# Patient Record
Sex: FEMALE
[De-identification: names, ages, dates, MRNs, and addresses within clinical notes are randomized per-mention and may not be internally consistent; named-entity substitution may affect disease eponyms.]

---

## 2017-01-04 ENCOUNTER — HOSPITAL (OUTPATIENT)
Dept: OTHER | Age: 62
End: 2017-01-04
Attending: EMERGENCY MEDICINE

## 2018-02-18 ENCOUNTER — HOSPITAL ENCOUNTER (EMERGENCY)
Age: 63
Discharge: HOME | End: 2018-02-18
Attending: EMERGENCY MEDICINE
Payer: COMMERCIAL

## 2018-02-18 ENCOUNTER — APPOINTMENT (OUTPATIENT)
Dept: GENERAL RADIOLOGY | Age: 63
End: 2018-02-18
Attending: EMERGENCY MEDICINE
Payer: COMMERCIAL

## 2018-02-18 VITALS
SYSTOLIC BLOOD PRESSURE: 100 MMHG | RESPIRATION RATE: 16 BRPM | WEIGHT: 185 LBS | HEIGHT: 65 IN | OXYGEN SATURATION: 99 % | BODY MASS INDEX: 30.82 KG/M2 | DIASTOLIC BLOOD PRESSURE: 53 MMHG | TEMPERATURE: 98 F | HEART RATE: 72 BPM

## 2018-02-18 DIAGNOSIS — M79.18 MUSCULOSKELETAL PAIN: Primary | ICD-10-CM

## 2018-02-18 PROCEDURE — 72170 X-RAY EXAM OF PELVIS: CPT | Performed by: EMERGENCY MEDICINE

## 2018-02-18 PROCEDURE — 99284 EMERGENCY DEPT VISIT MOD MDM: CPT

## 2018-02-18 PROCEDURE — 73552 X-RAY EXAM OF FEMUR 2/>: CPT | Performed by: EMERGENCY MEDICINE

## 2018-02-18 RX ORDER — QUETIAPINE 100 MG/1
150 TABLET, FILM COATED ORAL NIGHTLY
COMMUNITY
End: 2020-08-21 | Stop reason: DRUGHIGH

## 2018-02-18 RX ORDER — MONTELUKAST SODIUM 10 MG/1
10 TABLET ORAL NIGHTLY
COMMUNITY

## 2018-02-18 RX ORDER — DULOXETIN HYDROCHLORIDE 30 MG/1
30 CAPSULE, DELAYED RELEASE ORAL DAILY
COMMUNITY
End: 2020-08-21

## 2018-02-18 RX ORDER — ACETAMINOPHEN 500 MG
1000 TABLET ORAL ONCE
Status: COMPLETED | OUTPATIENT
Start: 2018-02-18 | End: 2018-02-18

## 2018-02-18 RX ORDER — CLONAZEPAM 1 MG/1
1 TABLET ORAL 2 TIMES DAILY PRN
Status: ON HOLD | COMMUNITY
End: 2020-11-13

## 2018-02-18 RX ORDER — DEXTROAMPHETAMINE SACCHARATE, AMPHETAMINE ASPARTATE MONOHYDRATE, DEXTROAMPHETAMINE SULFATE AND AMPHETAMINE SULFATE 7.5; 7.5; 7.5; 7.5 MG/1; MG/1; MG/1; MG/1
30 CAPSULE, EXTENDED RELEASE ORAL EVERY MORNING
Status: ON HOLD | COMMUNITY
End: 2020-11-09

## 2018-02-18 RX ORDER — CYCLOBENZAPRINE HCL 10 MG
10 TABLET ORAL 3 TIMES DAILY PRN
Qty: 20 TABLET | Refills: 0 | Status: SHIPPED | OUTPATIENT
Start: 2018-02-18 | End: 2018-02-25

## 2018-02-18 RX ORDER — FLUTICASONE PROPIONATE AND SALMETEROL 100; 50 UG/1; UG/1
1 POWDER RESPIRATORY (INHALATION) 2 TIMES DAILY
COMMUNITY
End: 2020-09-18

## 2018-02-18 RX ORDER — LAMOTRIGINE 200 MG/1
200 TABLET ORAL DAILY
COMMUNITY
End: 2020-08-21 | Stop reason: DRUGHIGH

## 2018-02-18 RX ORDER — ALBUTEROL SULFATE 90 UG/1
AEROSOL, METERED RESPIRATORY (INHALATION) EVERY 6 HOURS PRN
COMMUNITY

## 2018-02-18 RX ORDER — MELOXICAM 7.5 MG/1
7.5 TABLET ORAL DAILY
COMMUNITY
End: 2020-08-21 | Stop reason: DRUGHIGH

## 2018-02-18 NOTE — ED PROVIDER NOTES
Patient Seen in: [facility] In [place]    History   Patient presents with:  Lower Extremity Injury (musculoskeletal)  Pain (neurologic)    Stated Complaint: BILAT THIGH PAIN STATES PAIN SINCE SNOW FALL LAST WEEK    HPI    This is a 57 soft nontender the upper extremities normal the left lower extremity there is mild tenderness over the anterior thigh there is no edema is full range of motion of the hip knee ankle and foot neurovascular is intact the right lower extremity there is [employer]

## 2018-03-16 PROBLEM — M16.11 ARTHRITIS OF RIGHT HIP: Status: ACTIVE | Noted: 2018-03-16

## 2018-03-23 PROBLEM — S76.211D GROIN STRAIN, RIGHT, SUBSEQUENT ENCOUNTER: Status: ACTIVE | Noted: 2018-03-23

## 2018-04-19 PROBLEM — M19.90 GENERALIZED ARTHRITIS: Status: ACTIVE | Noted: 2018-04-19

## 2020-08-21 PROBLEM — E55.9 VITAMIN D DEFICIENCY: Status: ACTIVE | Noted: 2020-08-21

## 2020-10-12 PROBLEM — M79.671 RIGHT FOOT PAIN: Status: ACTIVE | Noted: 2020-10-12

## 2020-10-12 PROBLEM — R26.2 DIFFICULTY WALKING: Status: ACTIVE | Noted: 2020-10-12

## 2020-10-12 PROBLEM — Q82.8 POROKERATOSIS: Status: ACTIVE | Noted: 2020-10-12

## 2020-10-15 RX ORDER — FLUTICASONE PROPIONATE AND SALMETEROL 250; 50 UG/1; UG/1
1 POWDER RESPIRATORY (INHALATION) EVERY 12 HOURS SCHEDULED
COMMUNITY

## 2020-10-20 ENCOUNTER — LABORATORY ENCOUNTER (OUTPATIENT)
Dept: LAB | Facility: HOSPITAL | Age: 65
End: 2020-10-20
Attending: ORTHOPAEDIC SURGERY
Payer: MEDICARE

## 2020-10-20 DIAGNOSIS — M16.11 ARTHRITIS OF RIGHT HIP: ICD-10-CM

## 2020-10-20 PROCEDURE — 87081 CULTURE SCREEN ONLY: CPT

## 2020-10-20 PROCEDURE — 80053 COMPREHEN METABOLIC PANEL: CPT

## 2020-10-20 PROCEDURE — 86901 BLOOD TYPING SEROLOGIC RH(D): CPT

## 2020-10-20 PROCEDURE — 93005 ELECTROCARDIOGRAM TRACING: CPT

## 2020-10-20 PROCEDURE — 86850 RBC ANTIBODY SCREEN: CPT

## 2020-10-20 PROCEDURE — 85025 COMPLETE CBC W/AUTO DIFF WBC: CPT

## 2020-10-20 PROCEDURE — 85730 THROMBOPLASTIN TIME PARTIAL: CPT

## 2020-10-20 PROCEDURE — 86900 BLOOD TYPING SEROLOGIC ABO: CPT

## 2020-10-20 PROCEDURE — 85610 PROTHROMBIN TIME: CPT

## 2020-10-20 PROCEDURE — 93010 ELECTROCARDIOGRAM REPORT: CPT | Performed by: INTERNAL MEDICINE

## 2020-10-20 PROCEDURE — 36415 COLL VENOUS BLD VENIPUNCTURE: CPT

## 2020-11-06 ENCOUNTER — APPOINTMENT (OUTPATIENT)
Dept: LAB | Facility: HOSPITAL | Age: 65
End: 2020-11-06
Attending: ORTHOPAEDIC SURGERY
Payer: MEDICARE

## 2020-11-06 DIAGNOSIS — M16.11 ARTHRITIS OF RIGHT HIP: ICD-10-CM

## 2020-11-09 ENCOUNTER — HOSPITAL ENCOUNTER (OUTPATIENT)
Facility: HOSPITAL | Age: 65
Discharge: HOME HEALTH | End: 2020-11-11
Attending: ORTHOPAEDIC SURGERY | Admitting: ORTHOPAEDIC SURGERY
Payer: MEDICARE

## 2020-11-09 ENCOUNTER — ANESTHESIA EVENT (OUTPATIENT)
Dept: SURGERY | Facility: HOSPITAL | Age: 65
End: 2020-11-09
Payer: MEDICARE

## 2020-11-09 ENCOUNTER — APPOINTMENT (OUTPATIENT)
Dept: GENERAL RADIOLOGY | Facility: HOSPITAL | Age: 65
End: 2020-11-09
Attending: ORTHOPAEDIC SURGERY
Payer: MEDICARE

## 2020-11-09 ENCOUNTER — ANESTHESIA (OUTPATIENT)
Dept: SURGERY | Facility: HOSPITAL | Age: 65
End: 2020-11-09
Payer: MEDICARE

## 2020-11-09 DIAGNOSIS — M16.11 ARTHRITIS OF RIGHT HIP: Primary | ICD-10-CM

## 2020-11-09 DIAGNOSIS — M16.11 PRIMARY OSTEOARTHRITIS OF RIGHT HIP: ICD-10-CM

## 2020-11-09 PROCEDURE — 97530 THERAPEUTIC ACTIVITIES: CPT

## 2020-11-09 PROCEDURE — 97161 PT EVAL LOW COMPLEX 20 MIN: CPT

## 2020-11-09 PROCEDURE — 76000 FLUOROSCOPY <1 HR PHYS/QHP: CPT | Performed by: ORTHOPAEDIC SURGERY

## 2020-11-09 PROCEDURE — 88304 TISSUE EXAM BY PATHOLOGIST: CPT | Performed by: ORTHOPAEDIC SURGERY

## 2020-11-09 PROCEDURE — 0SR903Z REPLACEMENT OF RIGHT HIP JOINT WITH CERAMIC SYNTHETIC SUBSTITUTE, OPEN APPROACH: ICD-10-PCS | Performed by: ORTHOPAEDIC SURGERY

## 2020-11-09 PROCEDURE — 88311 DECALCIFY TISSUE: CPT | Performed by: ORTHOPAEDIC SURGERY

## 2020-11-09 DEVICE — CERAMIC HEAD UPCHARGE: Type: IMPLANTABLE DEVICE | Status: FUNCTIONAL

## 2020-11-09 DEVICE — PINNACLE HIP SOLUTIONS ALTRX POLYETHYLENE ACETABULAR LINER NEUTRAL 36MM ID 54MM OD
Type: IMPLANTABLE DEVICE | Site: HIP | Status: FUNCTIONAL
Brand: PINNACLE ALTRX

## 2020-11-09 DEVICE — PINNACLE POROCOAT ACETABULAR SHELL SECTOR II 54MM OD
Type: IMPLANTABLE DEVICE | Site: HIP | Status: FUNCTIONAL
Brand: PINNACLE POROCOAT

## 2020-11-09 DEVICE — BIOLOX DELTA CERAMIC FEMORAL HEAD +1.5 36MM DIA 12/14 TAPER
Type: IMPLANTABLE DEVICE | Site: HIP | Status: FUNCTIONAL
Brand: BIOLOX DELTA

## 2020-11-09 DEVICE — TOTAL HIP W/POR CUP & COCR HD: Type: IMPLANTABLE DEVICE | Status: FUNCTIONAL

## 2020-11-09 DEVICE — CORAIL HIP SYSTEM CEMENTLESS FEMORAL STEM 12/14 AMT 135 DEGREES KA SIZE 10 HA COATED STANDARD COLLAR
Type: IMPLANTABLE DEVICE | Site: HIP | Status: FUNCTIONAL
Brand: CORAIL

## 2020-11-09 RX ORDER — ACETAMINOPHEN 325 MG/1
TABLET ORAL
Status: COMPLETED
Start: 2020-11-09 | End: 2020-11-09

## 2020-11-09 RX ORDER — HYDROMORPHONE HYDROCHLORIDE 1 MG/ML
INJECTION, SOLUTION INTRAMUSCULAR; INTRAVENOUS; SUBCUTANEOUS
Status: COMPLETED
Start: 2020-11-09 | End: 2020-11-09

## 2020-11-09 RX ORDER — TEMAZEPAM 15 MG/1
15 CAPSULE ORAL NIGHTLY PRN
Status: DISCONTINUED | OUTPATIENT
Start: 2020-11-09 | End: 2020-11-11

## 2020-11-09 RX ORDER — CEFAZOLIN SODIUM/WATER 2 G/20 ML
SYRINGE (ML) INTRAVENOUS
Status: DISPENSED
Start: 2020-11-09 | End: 2020-11-09

## 2020-11-09 RX ORDER — PSEUDOEPHEDRINE HCL 30 MG
100 TABLET ORAL 2 TIMES DAILY
Qty: 60 CAPSULE | Refills: 0 | Status: SHIPPED | OUTPATIENT
Start: 2020-11-09

## 2020-11-09 RX ORDER — DEXTROAMPHETAMINE SACCHARATE, AMPHETAMINE ASPARTATE, DEXTROAMPHETAMINE SULFATE AND AMPHETAMINE SULFATE 7.5; 7.5; 7.5; 7.5 MG/1; MG/1; MG/1; MG/1
30 TABLET ORAL EVERY MORNING
Status: ON HOLD | COMMUNITY
End: 2020-11-13

## 2020-11-09 RX ORDER — DIPHENHYDRAMINE HYDROCHLORIDE 50 MG/ML
25 INJECTION INTRAMUSCULAR; INTRAVENOUS ONCE AS NEEDED
Status: ACTIVE | OUTPATIENT
Start: 2020-11-09 | End: 2020-11-09

## 2020-11-09 RX ORDER — ACETAMINOPHEN 500 MG
1000 TABLET ORAL 4 TIMES DAILY
Status: DISPENSED | OUTPATIENT
Start: 2020-11-09 | End: 2020-11-11

## 2020-11-09 RX ORDER — MONTELUKAST SODIUM 10 MG/1
10 TABLET ORAL NIGHTLY
Status: DISCONTINUED | OUTPATIENT
Start: 2020-11-09 | End: 2020-11-11

## 2020-11-09 RX ORDER — TRANEXAMIC ACID 10 MG/ML
1000 INJECTION, SOLUTION INTRAVENOUS ONCE
Status: DISCONTINUED | OUTPATIENT
Start: 2020-11-09 | End: 2020-11-09 | Stop reason: HOSPADM

## 2020-11-09 RX ORDER — DIPHENHYDRAMINE HCL 25 MG
25 CAPSULE ORAL EVERY 4 HOURS PRN
Status: DISCONTINUED | OUTPATIENT
Start: 2020-11-09 | End: 2020-11-11

## 2020-11-09 RX ORDER — DOCUSATE SODIUM 100 MG/1
100 CAPSULE, LIQUID FILLED ORAL 2 TIMES DAILY
Status: DISCONTINUED | OUTPATIENT
Start: 2020-11-09 | End: 2020-11-11

## 2020-11-09 RX ORDER — SODIUM CHLORIDE, SODIUM LACTATE, POTASSIUM CHLORIDE, CALCIUM CHLORIDE 600; 310; 30; 20 MG/100ML; MG/100ML; MG/100ML; MG/100ML
INJECTION, SOLUTION INTRAVENOUS CONTINUOUS
Status: DISCONTINUED | OUTPATIENT
Start: 2020-11-09 | End: 2020-11-09 | Stop reason: HOSPADM

## 2020-11-09 RX ORDER — QUETIAPINE 150 MG/1
150 TABLET, FILM COATED, EXTENDED RELEASE ORAL NIGHTLY
COMMUNITY

## 2020-11-09 RX ORDER — HYDROMORPHONE HYDROCHLORIDE 1 MG/ML
0.8 INJECTION, SOLUTION INTRAMUSCULAR; INTRAVENOUS; SUBCUTANEOUS EVERY 2 HOUR PRN
Status: ACTIVE | OUTPATIENT
Start: 2020-11-09 | End: 2020-11-11

## 2020-11-09 RX ORDER — KETOROLAC TROMETHAMINE 15 MG/ML
15 INJECTION, SOLUTION INTRAMUSCULAR; INTRAVENOUS EVERY 6 HOURS
Status: COMPLETED | OUTPATIENT
Start: 2020-11-09 | End: 2020-11-10

## 2020-11-09 RX ORDER — SODIUM PHOSPHATE, DIBASIC AND SODIUM PHOSPHATE, MONOBASIC 7; 19 G/133ML; G/133ML
1 ENEMA RECTAL ONCE AS NEEDED
Status: DISCONTINUED | OUTPATIENT
Start: 2020-11-09 | End: 2020-11-11

## 2020-11-09 RX ORDER — CEFAZOLIN SODIUM/WATER 2 G/20 ML
2 SYRINGE (ML) INTRAVENOUS EVERY 8 HOURS
Status: COMPLETED | OUTPATIENT
Start: 2020-11-09 | End: 2020-11-09

## 2020-11-09 RX ORDER — LIDOCAINE HYDROCHLORIDE 10 MG/ML
INJECTION, SOLUTION EPIDURAL; INFILTRATION; INTRACAUDAL; PERINEURAL AS NEEDED
Status: DISCONTINUED | OUTPATIENT
Start: 2020-11-09 | End: 2020-11-09 | Stop reason: SURG

## 2020-11-09 RX ORDER — QUETIAPINE 150 MG/1
150 TABLET, FILM COATED, EXTENDED RELEASE ORAL NIGHTLY
Status: DISCONTINUED | OUTPATIENT
Start: 2020-11-09 | End: 2020-11-09

## 2020-11-09 RX ORDER — SODIUM CHLORIDE, SODIUM LACTATE, POTASSIUM CHLORIDE, CALCIUM CHLORIDE 600; 310; 30; 20 MG/100ML; MG/100ML; MG/100ML; MG/100ML
INJECTION, SOLUTION INTRAVENOUS CONTINUOUS
Status: DISCONTINUED | OUTPATIENT
Start: 2020-11-09 | End: 2020-11-09

## 2020-11-09 RX ORDER — HYDROMORPHONE HYDROCHLORIDE 1 MG/ML
0.4 INJECTION, SOLUTION INTRAMUSCULAR; INTRAVENOUS; SUBCUTANEOUS EVERY 2 HOUR PRN
Status: DISPENSED | OUTPATIENT
Start: 2020-11-09 | End: 2020-11-11

## 2020-11-09 RX ORDER — POLYETHYLENE GLYCOL 3350 17 G/17G
17 POWDER, FOR SOLUTION ORAL DAILY PRN
Status: DISCONTINUED | OUTPATIENT
Start: 2020-11-09 | End: 2020-11-11

## 2020-11-09 RX ORDER — BISACODYL 10 MG
10 SUPPOSITORY, RECTAL RECTAL
Status: DISCONTINUED | OUTPATIENT
Start: 2020-11-09 | End: 2020-11-11

## 2020-11-09 RX ORDER — SENNOSIDES 8.6 MG
17.2 TABLET ORAL NIGHTLY
Status: DISCONTINUED | OUTPATIENT
Start: 2020-11-09 | End: 2020-11-11

## 2020-11-09 RX ORDER — NALOXONE HYDROCHLORIDE 0.4 MG/ML
80 INJECTION, SOLUTION INTRAMUSCULAR; INTRAVENOUS; SUBCUTANEOUS AS NEEDED
Status: DISCONTINUED | OUTPATIENT
Start: 2020-11-09 | End: 2020-11-09 | Stop reason: HOSPADM

## 2020-11-09 RX ORDER — DEXTROAMPHETAMINE SACCHARATE, AMPHETAMINE ASPARTATE, DEXTROAMPHETAMINE SULFATE AND AMPHETAMINE SULFATE 2.5; 2.5; 2.5; 2.5 MG/1; MG/1; MG/1; MG/1
30 TABLET ORAL EVERY MORNING
Status: DISCONTINUED | OUTPATIENT
Start: 2020-11-10 | End: 2020-11-11

## 2020-11-09 RX ORDER — ACETAMINOPHEN 500 MG
TABLET ORAL
Status: COMPLETED
Start: 2020-11-09 | End: 2020-11-09

## 2020-11-09 RX ORDER — DIPHENHYDRAMINE HYDROCHLORIDE 50 MG/ML
12.5 INJECTION INTRAMUSCULAR; INTRAVENOUS EVERY 4 HOURS PRN
Status: DISCONTINUED | OUTPATIENT
Start: 2020-11-09 | End: 2020-11-11

## 2020-11-09 RX ORDER — HYDROMORPHONE HYDROCHLORIDE 1 MG/ML
0.4 INJECTION, SOLUTION INTRAMUSCULAR; INTRAVENOUS; SUBCUTANEOUS EVERY 5 MIN PRN
Status: DISCONTINUED | OUTPATIENT
Start: 2020-11-09 | End: 2020-11-09 | Stop reason: HOSPADM

## 2020-11-09 RX ORDER — MIDAZOLAM HYDROCHLORIDE 1 MG/ML
INJECTION INTRAMUSCULAR; INTRAVENOUS AS NEEDED
Status: DISCONTINUED | OUTPATIENT
Start: 2020-11-09 | End: 2020-11-09 | Stop reason: SURG

## 2020-11-09 RX ORDER — PROCHLORPERAZINE EDISYLATE 5 MG/ML
10 INJECTION INTRAMUSCULAR; INTRAVENOUS EVERY 6 HOURS PRN
Status: ACTIVE | OUTPATIENT
Start: 2020-11-09 | End: 2020-11-11

## 2020-11-09 RX ORDER — SODIUM CHLORIDE 9 MG/ML
INJECTION, SOLUTION INTRAVENOUS CONTINUOUS
Status: DISCONTINUED | OUTPATIENT
Start: 2020-11-09 | End: 2020-11-09

## 2020-11-09 RX ORDER — LAMOTRIGINE 200 MG/1
200 TABLET ORAL DAILY
Status: DISCONTINUED | OUTPATIENT
Start: 2020-11-09 | End: 2020-11-09

## 2020-11-09 RX ORDER — MEPERIDINE HYDROCHLORIDE 25 MG/ML
12.5 INJECTION INTRAMUSCULAR; INTRAVENOUS; SUBCUTANEOUS AS NEEDED
Status: DISCONTINUED | OUTPATIENT
Start: 2020-11-09 | End: 2020-11-09 | Stop reason: HOSPADM

## 2020-11-09 RX ORDER — ONDANSETRON 2 MG/ML
4 INJECTION INTRAMUSCULAR; INTRAVENOUS AS NEEDED
Status: DISCONTINUED | OUTPATIENT
Start: 2020-11-09 | End: 2020-11-09 | Stop reason: HOSPADM

## 2020-11-09 RX ORDER — ACETAMINOPHEN 500 MG
1000 TABLET ORAL 4 TIMES DAILY
Qty: 120 TABLET | Refills: 0 | Status: ON HOLD | OUTPATIENT
Start: 2020-11-09 | End: 2020-11-13

## 2020-11-09 RX ORDER — ONDANSETRON 2 MG/ML
INJECTION INTRAMUSCULAR; INTRAVENOUS AS NEEDED
Status: DISCONTINUED | OUTPATIENT
Start: 2020-11-09 | End: 2020-11-09 | Stop reason: SURG

## 2020-11-09 RX ORDER — CELECOXIB 200 MG/1
200 CAPSULE ORAL DAILY
Qty: 30 CAPSULE | Refills: 0 | Status: SHIPPED | OUTPATIENT
Start: 2020-11-09

## 2020-11-09 RX ORDER — LAMOTRIGINE 25 MG/1
50 TABLET ORAL DAILY
Status: DISCONTINUED | OUTPATIENT
Start: 2020-11-09 | End: 2020-11-09

## 2020-11-09 RX ORDER — OXYCODONE HYDROCHLORIDE 5 MG/1
5 TABLET ORAL EVERY 6 HOURS PRN
Qty: 20 TABLET | Refills: 0 | Status: ON HOLD | OUTPATIENT
Start: 2020-11-09 | End: 2020-11-13

## 2020-11-09 RX ORDER — DEXAMETHASONE SODIUM PHOSPHATE 10 MG/ML
8 INJECTION, SOLUTION INTRAMUSCULAR; INTRAVENOUS ONCE
Status: COMPLETED | OUTPATIENT
Start: 2020-11-10 | End: 2020-11-10

## 2020-11-09 RX ORDER — KETAMINE HYDROCHLORIDE 50 MG/ML
INJECTION, SOLUTION, CONCENTRATE INTRAMUSCULAR; INTRAVENOUS AS NEEDED
Status: DISCONTINUED | OUTPATIENT
Start: 2020-11-09 | End: 2020-11-09 | Stop reason: SURG

## 2020-11-09 RX ORDER — OXYCODONE HYDROCHLORIDE 5 MG/1
5 TABLET ORAL EVERY 4 HOURS PRN
Status: DISPENSED | OUTPATIENT
Start: 2020-11-09 | End: 2020-11-11

## 2020-11-09 RX ORDER — ACETAMINOPHEN 500 MG
1000 TABLET ORAL ONCE
Status: DISCONTINUED | OUTPATIENT
Start: 2020-11-09 | End: 2020-11-09

## 2020-11-09 RX ORDER — ONDANSETRON 2 MG/ML
4 INJECTION INTRAMUSCULAR; INTRAVENOUS EVERY 4 HOURS PRN
Status: ACTIVE | OUTPATIENT
Start: 2020-11-09 | End: 2020-11-11

## 2020-11-09 RX ORDER — TRAMADOL HYDROCHLORIDE 50 MG/1
50 TABLET ORAL EVERY 6 HOURS PRN
Qty: 40 TABLET | Refills: 0 | Status: ON HOLD | OUTPATIENT
Start: 2020-11-09 | End: 2020-11-13

## 2020-11-09 RX ORDER — CLONAZEPAM 0.5 MG/1
TABLET ORAL 2 TIMES DAILY PRN
Status: DISCONTINUED | OUTPATIENT
Start: 2020-11-09 | End: 2020-11-11

## 2020-11-09 RX ORDER — DEXTROAMPHETAMINE SACCHARATE, AMPHETAMINE ASPARTATE, DEXTROAMPHETAMINE SULFATE AND AMPHETAMINE SULFATE 7.5; 7.5; 7.5; 7.5 MG/1; MG/1; MG/1; MG/1
15 TABLET ORAL SEE ADMIN INSTRUCTIONS
Status: ON HOLD | COMMUNITY
End: 2020-11-13

## 2020-11-09 RX ORDER — QUETIAPINE 25 MG/1
75 TABLET, FILM COATED ORAL 2 TIMES DAILY
Status: DISCONTINUED | OUTPATIENT
Start: 2020-11-09 | End: 2020-11-11

## 2020-11-09 RX ORDER — ALBUTEROL SULFATE 90 UG/1
1 AEROSOL, METERED RESPIRATORY (INHALATION) EVERY 6 HOURS PRN
Status: DISCONTINUED | OUTPATIENT
Start: 2020-11-09 | End: 2020-11-11

## 2020-11-09 RX ORDER — NICOTINE 21 MG/24HR
1 PATCH, TRANSDERMAL 24 HOURS TRANSDERMAL DAILY PRN
Status: DISCONTINUED | OUTPATIENT
Start: 2020-11-09 | End: 2020-11-11

## 2020-11-09 RX ORDER — HYDROMORPHONE HYDROCHLORIDE 1 MG/ML
0.2 INJECTION, SOLUTION INTRAMUSCULAR; INTRAVENOUS; SUBCUTANEOUS EVERY 2 HOUR PRN
Status: ACTIVE | OUTPATIENT
Start: 2020-11-09 | End: 2020-11-11

## 2020-11-09 RX ORDER — LAMOTRIGINE 200 MG/1
200 TABLET ORAL NIGHTLY
COMMUNITY

## 2020-11-09 RX ORDER — TIZANIDINE 2 MG/1
1 TABLET ORAL 3 TIMES DAILY PRN
Status: DISCONTINUED | OUTPATIENT
Start: 2020-11-09 | End: 2020-11-11

## 2020-11-09 RX ORDER — OXYCODONE HYDROCHLORIDE 10 MG/1
10 TABLET ORAL EVERY 4 HOURS PRN
Status: DISPENSED | OUTPATIENT
Start: 2020-11-09 | End: 2020-11-11

## 2020-11-09 RX ORDER — DEXAMETHASONE SODIUM PHOSPHATE 4 MG/ML
VIAL (ML) INJECTION AS NEEDED
Status: DISCONTINUED | OUTPATIENT
Start: 2020-11-09 | End: 2020-11-09 | Stop reason: SURG

## 2020-11-09 RX ORDER — OXYCODONE HYDROCHLORIDE 15 MG/1
15 TABLET ORAL EVERY 4 HOURS PRN
Status: ACTIVE | OUTPATIENT
Start: 2020-11-09 | End: 2020-11-11

## 2020-11-09 RX ORDER — METOCLOPRAMIDE HYDROCHLORIDE 5 MG/ML
10 INJECTION INTRAMUSCULAR; INTRAVENOUS AS NEEDED
Status: DISCONTINUED | OUTPATIENT
Start: 2020-11-09 | End: 2020-11-09 | Stop reason: HOSPADM

## 2020-11-09 RX ORDER — TRAMADOL HYDROCHLORIDE 50 MG/1
50 TABLET ORAL EVERY 6 HOURS
Status: DISCONTINUED | OUTPATIENT
Start: 2020-11-09 | End: 2020-11-11

## 2020-11-09 RX ORDER — MIDAZOLAM HYDROCHLORIDE 1 MG/ML
1 INJECTION INTRAMUSCULAR; INTRAVENOUS EVERY 5 MIN PRN
Status: DISCONTINUED | OUTPATIENT
Start: 2020-11-09 | End: 2020-11-09 | Stop reason: HOSPADM

## 2020-11-09 RX ORDER — LAMOTRIGINE 25 MG/1
50 TABLET ORAL NIGHTLY
COMMUNITY

## 2020-11-09 RX ORDER — ACETAMINOPHEN 325 MG/1
650 TABLET ORAL ONCE
Status: COMPLETED | OUTPATIENT
Start: 2020-11-09 | End: 2020-11-09

## 2020-11-09 RX ORDER — CEFAZOLIN SODIUM/WATER 2 G/20 ML
2 SYRINGE (ML) INTRAVENOUS EVERY 8 HOURS
Status: COMPLETED | OUTPATIENT
Start: 2020-11-09 | End: 2020-11-10

## 2020-11-09 RX ADMIN — DEXAMETHASONE SODIUM PHOSPHATE 8 MG: 4 MG/ML VIAL (ML) INJECTION at 10:30:00

## 2020-11-09 RX ADMIN — ONDANSETRON 4 MG: 2 INJECTION INTRAMUSCULAR; INTRAVENOUS at 10:30:00

## 2020-11-09 RX ADMIN — KETAMINE HYDROCHLORIDE 25 MG: 50 INJECTION, SOLUTION, CONCENTRATE INTRAMUSCULAR; INTRAVENOUS at 11:01:00

## 2020-11-09 RX ADMIN — LIDOCAINE HYDROCHLORIDE 25 MG: 10 INJECTION, SOLUTION EPIDURAL; INFILTRATION; INTRACAUDAL; PERINEURAL at 10:18:00

## 2020-11-09 RX ADMIN — SODIUM CHLORIDE, SODIUM LACTATE, POTASSIUM CHLORIDE, CALCIUM CHLORIDE: 600; 310; 30; 20 INJECTION, SOLUTION INTRAVENOUS at 12:03:00

## 2020-11-09 RX ADMIN — CEFAZOLIN SODIUM/WATER 2 G: 2 G/20 ML SYRINGE (ML) INTRAVENOUS at 10:26:00

## 2020-11-09 RX ADMIN — MIDAZOLAM HYDROCHLORIDE 2 MG: 1 INJECTION INTRAMUSCULAR; INTRAVENOUS at 10:08:00

## 2020-11-09 NOTE — ANESTHESIA POSTPROCEDURE EVALUATION
[facility] Patient Status:  Outpatient in a Bed   Age/Gender 77year old female MRN [id number]   [facility] SURGERY Attending Nj Maynard MD   Hosp Day # 0 PCP Darnell Winchester       Anesthesia Post-op Note    Procedure(s

## 2020-11-09 NOTE — CONSULTS
General Medicine Consult      Consulted by: Robbie Barone MD    PCP: Arvilla Landau    Reason for Consultation: Medical Management    History of Present Illness: Patient is a 77year old female with PMH including but not limited to ADD, bipolar, anxiety, SVT Daily    •  lamoTRIgine, 200 mg, Oral, Daily    •  lamoTRIgine, 50 mg, Oral, Daily    •  Montelukast Sodium, 10 mg, Oral, Nightly    •  [START ON 11/10/2020] Verapamil HCl ER, 120 mg, Oral, Nightly    •  QUEtiapine Fumarate ER, 150 mg, Oral, Nightly TECHNOLOGIST TIME:  1 hour 30 minutes RADIATION DOSE (AIR KERMA PRODUCT):  2.0181mGy             CONCLUSION:  Five images obtained during various stages of right hip arthroplasty. Images obtained for the purposes of surgical planning.  If additional diagno Hospitalist  Pager [phone]    11/9/2020  3:27 PM

## 2020-11-09 NOTE — ANESTHESIA PROCEDURE NOTES
Spinal Block  Performed by: Marifer Wallace MD  Authorized by: Marifer Wallace MD       General Information and Staff    Start Time:  11/9/2020 10:06 AM  End Time:  11/9/2020 10:13 AM  Anesthesiologist:  Marifer Wallace MD  Performed by:   Anesthes
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
## 2020-11-09 NOTE — HOME CARE LIAISON
MET WITH PTNT AND OFFERED CHOICE  OF AGENCIES. PTNT AGREEABLE TO [facility]. MET WITH PTNT TO DISCUSS HOME HEALTH SERVICES AND COVERAGE CRITERIA. PTNT AGREEABLE TO [facility]. PTNT GIVEN RESIDENTIAL BROCHURE.  RESIDENTIAL WITH PROVIDE SN/PT ON DISC

## 2020-11-09 NOTE — ANESTHESIA PREPROCEDURE EVALUATION
PRE-OP EVALUATION    Patient Name: Iris Cooper    Pre-op Diagnosis: Primary osteoarthritis of right hip [M16.11]    Procedure(s):  RIGHT ANTERIOR TOTAL HIP REPLACEMENT    Surgeon(s) and Role:     Lina Simms MD - Primary    Pre-op vitals reviewed. 2-4 times a month      Drinks per session: 1 or 2      Binge frequency: Never      Drug use:    Comment: edible-CBD with [facility]C     Available pre-op labs reviewed.   Lab Results   Component Value Date    WBC 6.7 10/20/2020    RBC 4.33 10/20/2020    HGB 13.6 10

## 2020-11-09 NOTE — OPERATIVE REPORT
[address] REPLACEMENT OPERATIVE REPORT    DATE OF SURGERY 11/9/2020    Shamika Burnham       [id number]     6/20/1955    PRE-OP DX:  RIGHT HIP PRIMARY OSTEOARTHRITIS  POST-OP DX:  RIGHT HIP PRIMARY OSTEOARTHRITIS  PROCEDURE:  DIRECT ANTE RETRACTORS WERE PLACED CAREFULLY. GOOD ACETABULAR EXPOSURE WAS OBTAINED. LABRAL TISSUE WAS EXCISED. MEDIAL WALL WAS IDENTIFIED AND CLEARED OF SOFT TISSUES. ACETABULAR REAMING WAS PERFORMED IN A SEQUENTIAL FASHION BY 1-2 mm.   REAMING WAS MEDIALIZED TO REEXPOSED. REAL LINER WAS IMPACTED. ITS SEATING WAS VERIFIED. LOCAL COCKTAIL WAS INFILTRATED CAREFULLY TO CAPSULE AND SURROUNDING SOFT TISSUE. PROXIMAL FEMUR WAS EXPOSED. MORE LOCAL COCKTAIL WAS INFILTRATED TO SURROUNDING SOFT TISSUE.   REAL FEMORAL ST

## 2020-11-09 NOTE — PROGRESS NOTES
NURSING ADMISSION NOTE      Patient admitted via bed from PACU. Oriented to room. Safety precautions initiated. Bed in low position. Call light in reach. Pt AOx4. VSS. Rates pain 7/10, dilaudid given. 2L o2. IM made aware of new consult.

## 2020-11-09 NOTE — PHYSICAL THERAPY NOTE
PHYSICAL THERAPY HIP EVALUATION - INPATIENT     Room Number: 377/377-A  Evaluation Date: 11/9/2020  Type of Evaluation: Initial  Physician Order: PT Eval and Treat    Presenting Problem: s/p right anterior BARBARA  Reason for Therapy: Mobility Dysfunction and is currently staying with a friend. States that she is mod indep in self care. Amb without AD or occasionally with cane. States that she hasn't been able to sit on her R buttock/hip in a while.      SUBJECTIVE  C/o 9/10 pain with transferring and ambulating wheelchair)?: A Little   -   Need to walk in hospital room?: A Little   -   Climbing 3-5 steps with a railing?: A Little       AM-PAC Score:  Raw Score: 20   Approx Degree of Impairment: 35.83%   Standardized Score (AM-PAC Scale): 47.67   CMS Modifier (G-C in WB on RLE due to pain, and Dec in knowledge regarding the integration of current limitations into functional mobility and amb, and fear of moving.        Functional outcome measures completed include The AM-PAC '6-Clicks' Inpatient Basic Mobility Short F surgical mask, gloves, goggles.

## 2020-11-09 NOTE — INTERVAL H&P NOTE
Pre-op Diagnosis: Primary osteoarthritis of right hip [M16.11]    The above referenced H&P was reviewed by Elvin Jones MD on 11/9/2020, the patient was examined and no significant changes have occurred in the patient's condition since the H&P was performed

## 2020-11-10 PROCEDURE — 83735 ASSAY OF MAGNESIUM: CPT | Performed by: INTERNAL MEDICINE

## 2020-11-10 PROCEDURE — 97535 SELF CARE MNGMENT TRAINING: CPT

## 2020-11-10 PROCEDURE — 80048 BASIC METABOLIC PNL TOTAL CA: CPT | Performed by: INTERNAL MEDICINE

## 2020-11-10 PROCEDURE — 97165 OT EVAL LOW COMPLEX 30 MIN: CPT

## 2020-11-10 PROCEDURE — 85027 COMPLETE CBC AUTOMATED: CPT | Performed by: ORTHOPAEDIC SURGERY

## 2020-11-10 NOTE — PLAN OF CARE
Pt. AOX4, RA, VSS post operative. Rt. LE CMS intact. Ambulating, minimal assist with walker. Surgical dressing with Aquacel; clean , dry, and intact. Gel ice applied. Encouraged to perform  IS and foot pumps when awake.  Pain managed with scheduled pain me

## 2020-11-10 NOTE — PHYSICAL THERAPY NOTE
PHYSICAL THERAPY TREATMENT NOTE - INPATIENT    Room Number: 377/377-A     Session: 1   Number of Visits to Meet Established Goals: 3    Presenting Problem: s/p right anterior BARBARA    History related to current admission: Pt is 77year old female admitted o Static Sitting: Good  Dynamic Sitting: Fair +           Static Standing: Fair -  Dynamic Standing: Poor +    ACTIVITY TOLERANCE                         O EXERCISES  Lower Extremity Ankle pumps  LAQ     Upper Extremity      Position Sitting     Repetitions   10   Sets   1     Patient End of Session: Up in chair; With [facility] staff;Needs met;Call light within reach;RN aware of session/findings; All patient questions

## 2020-11-10 NOTE — PROGRESS NOTES
Patient states she is feeling much better and having less pain than yesterday. Reviewed prevention of constipation side effect  from narcotics. Teachback done again on ankle pumps and incentive spriometry use.  Reviewed dressing changes, showering, discharg

## 2020-11-10 NOTE — PROGRESS NOTES
[facility] Hospitalist Progress Note     PCP: Anirudh Matute    Chief Complaint: follow-up    Overnight/Interim Events:      SUBJECTIVE:  Pt reports pain ok. No n/v, was hungry. Using IS.  SBP     OBJECTIVE:  Temp:  [97.8 °F (36.6 °C)-98.6 °F (37 °C)] 98.2 ° Verapamil HCl ER  120 mg Oral Nightly   • QUEtiapine Fumarate  75 mg Oral BID   • lamoTRIgine  250 mg Oral QPM       tiZANidine HCl, oxyCODONE HCl **OR** oxyCODONE HCl **OR** oxyCODONE HCl, HYDROmorphone HCl **OR** HYDROmorphone HCl **OR** HYDROmorphone HC

## 2020-11-10 NOTE — OCCUPATIONAL THERAPY NOTE
OCCUPATIONAL THERAPY QUICK EVALUATION - INPATIENT    Room Number: 377/377-A  Evaluation Date: 11/10/2020     Type of Evaluation: Quick Eval  Presenting Problem: s/p R BARBARA 11/9/20    Physician Order: IP Consult to Occupational Therapy  Reason for Therapy: (Comment)(no order indicated for BARBARA precautions in chart)  Fall Risk: High fall risk    WEIGHT BEARING RESTRICTION  Weight Bearing Restriction: R lower extremity        R Lower Extremity: Weight Bearing as Tolerated       PAIN ASSESSMENT  Ratin  Locat education on toileting and toilet transfer techniques, performed transfer with standby assist to raised height toilet with use of grab bar required (reports has RTS with arms in same location at home), toileting via SBA.  Patient also educated on OT role, s services. Please re-order if a new functional limitation presents during this admission. Patient was able to achieve the following goals:  Patient able to toilet transfer:  At supervision level or above; patient reports will have supervision at home  Pa

## 2020-11-10 NOTE — PLAN OF CARE
Problem: Impaired Activities of Daily Living  Goal: Achieve highest/safest level of independence in self care  Description: Interventions:  - Assess ability and encourage patient to participate in ADLs to maximize function  - Promote sitting position [facility]

## 2020-11-10 NOTE — PLAN OF CARE
Pt A&Ox4. On room air. IS encouraged. SCDs to BLE. Incision to R hip with aquacel drsg C/D/I. Tubi- to RLE. Gel ice packs on for pain and swelling. Pt tolerating general diet. Last BM 11/8/20. Miralax given this AM. Voiding without difficulty.  Pain con

## 2020-11-10 NOTE — PROGRESS NOTES
Orthopedic surgery progress note    Ryder Snow Patient Status:  Outpatient in a Bed    1955 MRN [id number]   [facility] 3SW-A Attending Anibal Michaels MD   Hosp Day # 0 PCP YVAN [facility]       Subjective:  No major complaints.   No c

## 2020-11-10 NOTE — CM/SW NOTE
11/10/20 1100   CM/SW Referral Data   Referral Source Social Work (self-referral)   Reason for Referral Discharge planning   Discharge Needs   Anticipated D/C needs Home health care       HOME SITUATION  Type of Home: House   Home Layout: One level  10 Shannon Sarmiento

## 2020-11-11 ENCOUNTER — HOSPITAL ENCOUNTER (OUTPATIENT)
Facility: HOSPITAL | Age: 65
Setting detail: OBSERVATION
Discharge: NURSING, REHAB OR LONG-TERM CARE | End: 2020-11-13
Attending: EMERGENCY MEDICINE | Admitting: INTERNAL MEDICINE
Payer: MEDICARE

## 2020-11-11 VITALS
SYSTOLIC BLOOD PRESSURE: 123 MMHG | RESPIRATION RATE: 18 BRPM | BODY MASS INDEX: 31.32 KG/M2 | WEIGHT: 188 LBS | HEIGHT: 65 IN | TEMPERATURE: 98 F | OXYGEN SATURATION: 100 % | HEART RATE: 83 BPM | DIASTOLIC BLOOD PRESSURE: 75 MMHG

## 2020-11-11 DIAGNOSIS — Z96.641 HISTORY OF RIGHT HIP REPLACEMENT: Primary | ICD-10-CM

## 2020-11-11 PROCEDURE — 97530 THERAPEUTIC ACTIVITIES: CPT

## 2020-11-11 PROCEDURE — 83735 ASSAY OF MAGNESIUM: CPT | Performed by: INTERNAL MEDICINE

## 2020-11-11 PROCEDURE — 85027 COMPLETE CBC AUTOMATED: CPT | Performed by: INTERNAL MEDICINE

## 2020-11-11 PROCEDURE — 80048 BASIC METABOLIC PNL TOTAL CA: CPT | Performed by: INTERNAL MEDICINE

## 2020-11-11 PROCEDURE — 97110 THERAPEUTIC EXERCISES: CPT

## 2020-11-11 PROCEDURE — 97116 GAIT TRAINING THERAPY: CPT

## 2020-11-11 NOTE — PLAN OF CARE
Pt A&Ox4. On room air. IS encouraged. SCDs to BLE. Incision to R hip with Aquacel drsg C/D/I. Tubi- to R leg. Gel ice packs on for pain and swelling. Pt tolerating general diet. Last BM 11/8/20.  Miralax and MOM given this AM. Voiding without difficulty

## 2020-11-11 NOTE — PLAN OF CARE
A&O x 4. VSS. On RA. Right hip pain well controlled with scheduled and PRN pain medication. Aquacel to anterior thigh C/D/I. Gel ice in place. Up with min assist and walker to bathroom, voiding without issue. SCD's/Tubi  to RLE/Eliquis.  Reviewed POC, p

## 2020-11-11 NOTE — PHYSICAL THERAPY NOTE
PHYSICAL THERAPY TREATMENT NOTE - INPATIENT    Room Number: 377/377-A     Session: 2  Number of Visits to Meet Established Goals: 3    Presenting Problem: s/p right anterior BARBARA    History related to current admission: Pt is 77year old female admitted on mechanics;Breathing techniques;Relaxation;Repositioning    BALANCE                                                                                                                     Static Sitting: Fair +  Dynamic Sitting: Fair +           Static Standing able to ambulate to<>from therapy gym - no overt LOB and denied dizziness. Pt completed stairs, and safe usage of railing/cane.       Standardized Assessment  NA    Axillary Transfers/Environmental Barriers    Toilet/Commode Transfers: na  Stairs: SBA  Cur

## 2020-11-11 NOTE — PROGRESS NOTES
[facility] Hospitalist Progress Note     PCP: Gus Lim    Chief Complaint: follow-up    Overnight/Interim Events:      SUBJECTIVE:  Pt laying in bed, pain ok. Some itching o/n. No n/v. Walked.     OBJECTIVE:  Temp:  [98.1 °F (36.7 °C)-98.6 °F (37 °C)] 98.3 ° 10 mg Oral Nightly   • Verapamil HCl ER  120 mg Oral Nightly   • QUEtiapine Fumarate  75 mg Oral BID   • lamoTRIgine  250 mg Oral QPM       tiZANidine HCl, temazepam, PEG 3350, magnesium hydroxide, bisacodyl, Fleet Enema, diphenhydrAMINE **OR** diphenhydrA

## 2020-11-11 NOTE — PROGRESS NOTES
Orthopedic surgery progress note    Silva Snow Patient Status:  Outpatient in a Bed    1955 MRN [id number]   [facility] 3SW-A Attending Letitia Mcdaniel MD   Hosp Day # 0 PCP YVAN [facility]       Subjective:  No major complaints.   No c

## 2020-11-12 PROBLEM — Z96.641 HISTORY OF RIGHT HIP REPLACEMENT: Status: ACTIVE | Noted: 2020-11-12

## 2020-11-12 PROBLEM — R79.89 AZOTEMIA: Status: ACTIVE | Noted: 2020-11-12

## 2020-11-12 PROCEDURE — 90792 PSYCH DIAG EVAL W/MED SRVCS: CPT | Performed by: OTHER

## 2020-11-12 RX ORDER — DOCUSATE SODIUM 100 MG/1
100 CAPSULE, LIQUID FILLED ORAL 2 TIMES DAILY
Status: DISCONTINUED | OUTPATIENT
Start: 2020-11-12 | End: 2020-11-13

## 2020-11-12 RX ORDER — ONDANSETRON 2 MG/ML
4 INJECTION INTRAMUSCULAR; INTRAVENOUS EVERY 6 HOURS PRN
Status: DISCONTINUED | OUTPATIENT
Start: 2020-11-12 | End: 2020-11-13

## 2020-11-12 RX ORDER — BUTALBITAL, ACETAMINOPHEN AND CAFFEINE 50; 325; 40 MG/1; MG/1; MG/1
1 TABLET ORAL EVERY 4 HOURS PRN
Status: DISCONTINUED | OUTPATIENT
Start: 2020-11-12 | End: 2020-11-13

## 2020-11-12 RX ORDER — METOCLOPRAMIDE HYDROCHLORIDE 5 MG/ML
10 INJECTION INTRAMUSCULAR; INTRAVENOUS EVERY 8 HOURS PRN
Status: DISCONTINUED | OUTPATIENT
Start: 2020-11-12 | End: 2020-11-12

## 2020-11-12 RX ORDER — TRAMADOL HYDROCHLORIDE 50 MG/1
50 TABLET ORAL EVERY 6 HOURS PRN
Status: DISCONTINUED | OUTPATIENT
Start: 2020-11-12 | End: 2020-11-13

## 2020-11-12 RX ORDER — ALBUTEROL SULFATE 90 UG/1
1 AEROSOL, METERED RESPIRATORY (INHALATION) EVERY 6 HOURS PRN
Status: DISCONTINUED | OUTPATIENT
Start: 2020-11-12 | End: 2020-11-13

## 2020-11-12 RX ORDER — QUETIAPINE 25 MG/1
75 TABLET, FILM COATED ORAL 2 TIMES DAILY
Status: DISCONTINUED | OUTPATIENT
Start: 2020-11-12 | End: 2020-11-12

## 2020-11-12 RX ORDER — CLONAZEPAM 1 MG/1
1 TABLET ORAL 2 TIMES DAILY PRN
Status: DISCONTINUED | OUTPATIENT
Start: 2020-11-12 | End: 2020-11-12

## 2020-11-12 RX ORDER — OXYCODONE HYDROCHLORIDE 10 MG/1
5 TABLET ORAL ONCE
Status: DISCONTINUED | OUTPATIENT
Start: 2020-11-12 | End: 2020-11-12

## 2020-11-12 RX ORDER — OXYCODONE HYDROCHLORIDE 5 MG/1
5 TABLET ORAL EVERY 4 HOURS PRN
Status: DISCONTINUED | OUTPATIENT
Start: 2020-11-12 | End: 2020-11-13

## 2020-11-12 RX ORDER — ACETAMINOPHEN 325 MG/1
650 TABLET ORAL EVERY 6 HOURS PRN
Status: DISCONTINUED | OUTPATIENT
Start: 2020-11-12 | End: 2020-11-13

## 2020-11-12 RX ORDER — OXYCODONE HYDROCHLORIDE 5 MG/1
5 TABLET ORAL ONCE
Status: COMPLETED | OUTPATIENT
Start: 2020-11-12 | End: 2020-11-12

## 2020-11-12 RX ORDER — ACETAMINOPHEN 500 MG
1000 TABLET ORAL ONCE
Status: COMPLETED | OUTPATIENT
Start: 2020-11-12 | End: 2020-11-12

## 2020-11-12 RX ORDER — HEPARIN SODIUM 5000 [USP'U]/ML
5000 INJECTION, SOLUTION INTRAVENOUS; SUBCUTANEOUS EVERY 8 HOURS SCHEDULED
Status: DISCONTINUED | OUTPATIENT
Start: 2020-11-12 | End: 2020-11-12

## 2020-11-12 RX ORDER — OXYCODONE HYDROCHLORIDE 5 MG/1
5 TABLET ORAL EVERY 6 HOURS PRN
Status: DISCONTINUED | OUTPATIENT
Start: 2020-11-12 | End: 2020-11-12

## 2020-11-12 RX ORDER — CLONAZEPAM 0.5 MG/1
0.5 TABLET ORAL 2 TIMES DAILY
Status: DISCONTINUED | OUTPATIENT
Start: 2020-11-12 | End: 2020-11-13

## 2020-11-12 RX ORDER — MONTELUKAST SODIUM 10 MG/1
10 TABLET ORAL NIGHTLY
Status: DISCONTINUED | OUTPATIENT
Start: 2020-11-12 | End: 2020-11-13

## 2020-11-12 NOTE — PROGRESS NOTES
Pt states her ride is expected to be here around 1900. Pt cleared by all MDs for discharge. Discharge paperwork given to pt. PIV removed. All questions answered.
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
## 2020-11-12 NOTE — CM/SW NOTE
11/12/20 1000   CM/SW Referral Data   Referral Source Social Work (self-referral)   Reason for Referral Discharge planning   Informant Patient;Edward Staff   Patient Info   Patient's Mental Status Alert;Oriented   Discharge Needs   Anticipated D/C needs

## 2020-11-12 NOTE — ED NOTES
Pt tearful upon arrival, related that pt was depressed due to \"hard time coping\" after discharge. Have argument with friend on the way home after discharge, friend brought her to [facility] due to pt verbalizing \" im dead to you\".  Pt verbalized that shes have

## 2020-11-12 NOTE — H&P
[facility] Hospitalist H&P     CC: Patient presents with:  [place]       PCP: Tammy Huerta    Admitted 11/11/20  Assessment and Plan     Mary  is a 77year old female with PMH sig for ADD, anxiety, Bipolar affective d/o most recently manic, migraines doing well with PT. Pain controlled.       Review of Systems  12 point systems reviewed, please see HPI for pertinent positives, otherwise negative    History     PMH  Past Medical History:   Diagnosis Date   • ADD (attention deficit disorder)    • Anesthes mouth daily. , Disp: 30 capsule, Rfl: 0    •  fluticasone-salmeterol 250-50 MCG/DOSE Inhalation Aerosol Powder, Breath Activated, Inhale 1 puff into the lungs every 12 (twelve) hours. , Disp: , Rfl:     •  Fluticasone Propionate (FLONASE NA), 2 sprays by Abilio 122*   CA 9.1 8.8 8.9   MG 2.2 2.2  --        No results for input(s): ALT, AST, ALB, AMYLASE, LIPASE, LDH in the last 168 hours. Invalid input(s): ALPHOS, TBIL, DBIL, TPROT    No results for input(s): TROP in the last 168 hours.          Radiology: Maribell Rubi

## 2020-11-12 NOTE — PROGRESS NOTES
NURSING ADMISSION NOTE      Patient admitted via Cart  Oriented to room. Safety precautions initiated. Bed in low position. Call light in reach. Pt transferred to bed x1 assist with the RW and GB. Moved without difficulty.  Belongings at bedside

## 2020-11-12 NOTE — ED NOTES
Dr Mary Mary @ bedside, pt verbalized no intent of harming self but needing help with ADLs, was asked about why pt did not go to rehab per pt the said friend was offered help thus cancelling rehab admit

## 2020-11-12 NOTE — PLAN OF CARE
Pt verbalized understanding of poc & call dont fall protocol. Psych liaison APN notified & aware of psych liaison consult & of psychiatry consult for Dr. Lainey Cruz, per apn she anette let dr jacques know & update regarding pt. Pain controlled on pain meds.  Will con

## 2020-11-12 NOTE — PHYSICAL THERAPY NOTE
PHYSICAL THERAPY KNEE EVALUATION - INPATIENT     Room Number: 377/377-A  Evaluation Date: 11/12/2020  Type of Evaluation: Initial  Physician Order: PT Eval and Treat    Presenting Problem: BARBARA anterior 11/09/20  Reason for Therapy: Mobility Dysfunction and currently staying with a friend. States that she is mod indep in self care. Amb without AD or occasionally with cane.         SUBJECTIVE  \"I am exhausted and tired and I am really depressed, I just want things to be over with, I have no appetite and I feel Rolling walker  Pattern: R Decreased stance time(decrease swing flexion, decrease stride length)     Comment : almost shuffling like gt with decrease WB time on the R LE c/o pain and c/o exhaustion    Skilled Therapy Provided: In bed, room darkened with c/ activity tolerated  Gait training  Posture  ROM  Transfer training    Patient End of Session: In bed;Needs met;Call light within reach;RN aware of session/findings; All patient questions and concerns addressed    ASSESSMENT   Patient is a 77year old female device at assistance level: modified independent    Goal #4    Patient will negotiate 4 stairs/one curb w/ assistive device and supervision   Goal #5    Patient verbalizes and/or demonstrates all precautions and safety concerns independently   Goal #6

## 2020-11-12 NOTE — OCCUPATIONAL THERAPY NOTE
OCCUPATIONAL THERAPY EVALUATION - INPATIENT     Room Number: 377/377-A  Evaluation Date: 11/12/2020  Type of Evaluation: Initial  Presenting Problem: R BARBARA 11/9    Physician Order: IP Consult to Occupational Therapy  Reason for Therapy: ADL/IADL Dysfunctio to hip pain prior to admit. SUBJECTIVE   Pt states \"I wish I hadn't woken up from surgery. \"    Patient self-stated goal is to be independent     OBJECTIVE  Precautions: Hip abduction pillow  Fall Risk: Standard fall risk    WEIGHT BEARING RESTRICTI pants mod (I) via reacher. Pt requires min (A) to manage sock over heel with use of sock aide. Unable to don R boot via AE despite extended time engaged in trial and pt dedication to working on task. Educated pt on safety during functional transfers.  Pt co Recommendations: Sock aid    PLAN  OT Treatment Plan: Balance activities; ADL training;Patient/Family education;Patient/Family training;Equipment eval/education; Compensatory technique education  Rehab Potential : Good  Frequency (Obs): 3-5x/week  Number of

## 2020-11-12 NOTE — ED PROVIDER NOTES
Patient Seen in: [facility] Emergency Department      History   Patient presents with:  Eval-P    Stated Complaint: eval p    HPI    This is an extremely pleasant 49-year-old female sent in by [facility] for evaluation.   Patient was just d Patrick Peralta MD at [facility] MAIN OR   • KNEE SURGERY      left x 2 and right x 1   • OTHER Bilateral     salpingo oophorectomy   • OTHER Right     wrist ganglion cyst removal   • REMOVAL OF OVARIAN CYST(S)                      Social History    Tobacco Use diagnosis)    Disposition:  Admit  11/12/2020  4:03 am    Follow-up:  No follow-up provider specified.         Medications Prescribed:  Current Discharge Medication List                          Present on Admission  Date Reviewed: 11/11/2020          ICD-1

## 2020-11-12 NOTE — PLAN OF CARE
Problem: Impaired Activities of Daily Living  Goal: Achieve highest/safest level of independence in self care  Description: Interventions:  - Assess ability and encourage patient to participate in ADLs to maximize function  - Promote sitting position [facility]
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
## 2020-11-13 ENCOUNTER — EXTERNAL FACILITY (OUTPATIENT)
Dept: FAMILY MEDICINE CLINIC | Facility: CLINIC | Age: 65
End: 2020-11-13

## 2020-11-13 VITALS
RESPIRATION RATE: 20 BRPM | HEART RATE: 82 BPM | HEIGHT: 65 IN | TEMPERATURE: 99 F | SYSTOLIC BLOOD PRESSURE: 118 MMHG | WEIGHT: 193 LBS | BODY MASS INDEX: 32.15 KG/M2 | OXYGEN SATURATION: 98 % | DIASTOLIC BLOOD PRESSURE: 86 MMHG

## 2020-11-13 DIAGNOSIS — R53.1 GENERALIZED WEAKNESS: ICD-10-CM

## 2020-11-13 DIAGNOSIS — G43.709 CHRONIC MIGRAINE WITHOUT AURA WITHOUT STATUS MIGRAINOSUS, NOT INTRACTABLE: ICD-10-CM

## 2020-11-13 DIAGNOSIS — J41.0 SMOKERS' COUGH (HCC): ICD-10-CM

## 2020-11-13 DIAGNOSIS — J45.40 MODERATE PERSISTENT ASTHMA WITHOUT COMPLICATION: ICD-10-CM

## 2020-11-13 DIAGNOSIS — F31.63 BIPOLAR DISORDER, CURRENT EPISODE MIXED, SEVERE, WITHOUT PSYCHOTIC FEATURES (HCC): ICD-10-CM

## 2020-11-13 DIAGNOSIS — R53.81 PHYSICAL DECONDITIONING: ICD-10-CM

## 2020-11-13 DIAGNOSIS — F10.20 UNCOMPLICATED ALCOHOL DEPENDENCE (HCC): ICD-10-CM

## 2020-11-13 DIAGNOSIS — Z96.641 S/P HIP REPLACEMENT, RIGHT: Primary | ICD-10-CM

## 2020-11-13 DIAGNOSIS — F39 MOOD DISORDER (HCC): ICD-10-CM

## 2020-11-13 PROCEDURE — 99306 1ST NF CARE HIGH MDM 50: CPT | Performed by: FAMILY MEDICINE

## 2020-11-13 RX ORDER — OXYCODONE HYDROCHLORIDE 5 MG/1
5 TABLET ORAL EVERY 6 HOURS PRN
Qty: 20 TABLET | Refills: 0 | Status: SHIPPED | OUTPATIENT
Start: 2020-11-13 | End: 2020-11-17 | Stop reason: ALTCHOICE

## 2020-11-13 RX ORDER — CLONAZEPAM 1 MG/1
TABLET ORAL
Qty: 1 TABLET | Refills: 0 | Status: SHIPPED | OUTPATIENT
Start: 2020-11-13

## 2020-11-13 RX ORDER — DEXTROAMPHETAMINE SACCHARATE, AMPHETAMINE ASPARTATE, DEXTROAMPHETAMINE SULFATE AND AMPHETAMINE SULFATE 7.5; 7.5; 7.5; 7.5 MG/1; MG/1; MG/1; MG/1
15 TABLET ORAL SEE ADMIN INSTRUCTIONS
Qty: 10 TABLET | Refills: 0 | Status: SHIPPED | OUTPATIENT
Start: 2020-11-13

## 2020-11-13 RX ORDER — CLONAZEPAM 1 MG/1
TABLET ORAL
Qty: 1 TABLET | Refills: 0 | Status: SHIPPED | COMMUNITY
Start: 2020-11-13 | End: 2020-11-13

## 2020-11-13 RX ORDER — TRAMADOL HYDROCHLORIDE 50 MG/1
50 TABLET ORAL EVERY 6 HOURS PRN
Qty: 40 TABLET | Refills: 0 | Status: SHIPPED | OUTPATIENT
Start: 2020-11-13 | End: 2020-11-17 | Stop reason: ALTCHOICE

## 2020-11-13 RX ORDER — ACETAMINOPHEN 500 MG
1000 TABLET ORAL EVERY 6 HOURS PRN
Qty: 120 TABLET | Refills: 0 | Status: SHIPPED | OUTPATIENT
Start: 2020-11-13

## 2020-11-13 RX ORDER — DEXTROAMPHETAMINE SACCHARATE, AMPHETAMINE ASPARTATE, DEXTROAMPHETAMINE SULFATE AND AMPHETAMINE SULFATE 7.5; 7.5; 7.5; 7.5 MG/1; MG/1; MG/1; MG/1
30 TABLET ORAL EVERY MORNING
Qty: 10 TABLET | Refills: 0 | Status: SHIPPED | OUTPATIENT
Start: 2020-11-13

## 2020-11-13 NOTE — PLAN OF CARE
Pt ok to dc to [facility] per estevan corado. Report given to rn at [facility]. Pt given address & phone number for [facility]. Rxs for meds & dc avs given to [employer] . Pt verbalized understanding of poc & dc instructions.

## 2020-11-13 NOTE — DISCHARGE SUMMARY
Discharge Summary  Patient ID:  Carl Vegas  [id number]  89 year old  6/20/1955    Admit date: 11/9/2020    Discharge date and time: 11/11/20    Attending Physician: No att. providers found     Reason for admission: right hip primary OA    Discharge Madhavi Lewis amphetamine-dextroamphetamine 30 MG Oral Tab  Take 30 mg by mouth every morning.  , Historical    !! amphetamine-dextroamphetamine 30 MG Oral Tab  Take 15 mg by mouth See Admin Instructions.  Takes 15mg in the afternoon., Historical    !! lamoTRIgine 25 MG tolerated    Follow-up with Sonya Powers MD in 2 weeks. Signed:   Sonya Powers MD  11/13/2020  7:29 AM

## 2020-11-13 NOTE — PLAN OF CARE
Pt a/o x4. RA//IS. SCD/ankle pumps. Regular diet tolerating well. Last bowel movement 11/11. Voiding freely. Up min assist with walker. Pain controlled with PO pain medication. Aquacel dressing to R hip CDI. Call light within reach.  All safety measures

## 2020-11-13 NOTE — CONSULTS
[facility]  Report of Psychiatric Consultation    Hardik Collins Patient Status:  Observation    1955 MRN [id number]   [facility] 3SW-A Attending Gypsy Hernandez MD   Hosp Day # 1 PCP Hurman Claude     Date of Admission:  ideation. She told her friend to take her to [facility] because she was anxious and stressed and angry. [facility] sent her to the ED, concerned that she was delirious and intoxicated given her slurred speech.  She DENIED abusing her Norco prn or drinking oophorectomy   • OTHER Right     wrist ganglion cyst removal   • REMOVAL OF OVARIAN CYST(S)       History reviewed. No pertinent family history. reports that she has been smoking. She has been smoking about 1.00 pack per day.  She has never used smokeless irritable  Affect: Congruent    Thought process: logical mostly, at times tangential  Thought content: no delusions  Perceptions: no hallucinations  Associations: Intact    Orientation: Oriented person, place, time, situation  Attention and Concentration:

## 2020-11-13 NOTE — CM/SW NOTE
11/13/20 1015   Discharge disposition   Expected discharge disposition Skilled Nurs   Name of Keegan Rodriguez   Discharge transportation [facility]

## 2020-11-13 NOTE — CM/SW NOTE
Spoke with pt's RN who stated pt can discharge to [facility] today. Only accepting facility is [facility]. Spoke with Alethia Najjar from [facility] who confirmed pt can be accepted for admission today. Pt will go to room 129. She requested DC after lunch.

## 2020-11-13 NOTE — PROGRESS NOTES
PSYCH CONSULT    Date of Admission: 11/11/20  Date of Consult: 11/12/20  Reason for Consultation: Bipolar disorder, anxiety    Impression:  Primary Psychiatric Diagnosis:  Bipolar disorder unspecified with mood irritability and lability and depressive symp

## 2020-11-13 NOTE — PHYSICAL THERAPY NOTE
PHYSICAL THERAPY HIP TREATMENT NOTE - INPATIENT      Room Number: 377/377-A     Session: 1  Number of Visits to Meet Established Goals: 3    Presenting Problem: BARBARA anterior 11/09/20    Problem List  Principal Problem:    History of right hip replacement wheelchair, bedside commode, etc.): A Little   -   Moving from lying on back to sitting on the side of the bed?: A Little   How much help from another person does the patient currently need. ..   -   Moving to and from a bed to a chair (including a wheelcha Patient End of Session: Up in chair;Needs met;Call light within reach;RN aware of session/findings; All patient questions and concerns addressed;SCDs in place; Ice applied; Alarm set    ASSESSMENT     Pt seen this AM for bed mobility, transfers, gait tr

## 2020-11-13 NOTE — PLAN OF CARE
Sw setting up [facility] for dc, pt ok to dc to [facility] per psych md. Pt verbalized understanding of poc & call dont fall protocol.

## 2020-11-14 NOTE — DISCHARGE SUMMARY
[facility] Internal Medicine Discharge Summary   Patient ID:  Wilver Valdez  [id number]  47 year old  6/20/1955    Admit date: 11/11/2020    Discharge date and time: 11/13/2020  1:35 PM      Attending Physician: No att. providers found     [id number] FLONASE NA     fluticasone-salmeterol 250-50 MCG/DOSE Aepb  Commonly known as: ADVAIR DISKUS     IMITREX OR     * lamoTRIgine 25 MG Tabs  Commonly known as: LAMICTAL     * lamoTRIgine 200 MG Tabs  Commonly known as: LAMICTAL     Montelukast Sodium 10 MG Ta She is very easily overwhelmed, tearful on exam.  Denies any active SI or plan. Just very worried about all the things that could happen and gets worked up easily. No Cp or SOB. Is doing well with PT. Pain controlled.       Hospital Course  Pt seen by ps No acute distress, alert and orientedx3  Lungs Clear  Heart Regular  Abdomen Benign    Total Time Coordinating Care: > than 30 minutes    Patient had opportunity to ask questions and state understand and agree with therapeutic plan as outlined

## 2020-11-15 PROBLEM — J45.40 MODERATE PERSISTENT ASTHMA WITHOUT COMPLICATION: Status: ACTIVE | Noted: 2020-11-15

## 2020-11-15 PROBLEM — Z96.641 S/P HIP REPLACEMENT, RIGHT: Status: ACTIVE | Noted: 2020-11-15

## 2020-11-15 PROBLEM — F10.20 UNCOMPLICATED ALCOHOL DEPENDENCE (HCC): Status: ACTIVE | Noted: 2020-11-15

## 2020-11-15 PROBLEM — F39 MOOD DISORDER: Status: ACTIVE | Noted: 2020-11-15

## 2020-11-15 PROBLEM — F31.9 AFFECTIVE PSYCHOSIS, BIPOLAR (HCC): Status: ACTIVE | Noted: 2020-11-15

## 2020-11-15 PROBLEM — J45.40 MODERATE PERSISTENT ASTHMA WITHOUT COMPLICATION (HCC): Status: ACTIVE | Noted: 2020-11-15

## 2020-11-15 PROBLEM — R53.81 PHYSICAL DECONDITIONING: Status: ACTIVE | Noted: 2020-11-15

## 2020-11-15 PROBLEM — F39 MOOD DISORDER (HCC): Status: ACTIVE | Noted: 2020-11-15

## 2020-11-15 PROBLEM — R53.1 GENERALIZED WEAKNESS: Status: ACTIVE | Noted: 2020-11-15

## 2020-11-15 NOTE — PROGRESS NOTES
Chief Complaint:   Patient presents with: Follow - Up: karly Balderrama admit, s/p right hip surgery, with significant mood disorder.      HPI:   This is a 77year old female 76 yo female presenting to [facility] s/p right hip replacement-per dr. Alyx Canada, currently on [facility] Use      Smoking status: Current Every Day Smoker        Packs/day: 1.00      Smokeless tobacco: Never Used    Alcohol use: Yes      Frequency: 2-4 times a month      Drinks per session: 1 or 2      Binge frequency: Never    Drug use: Yes      Comment: pascual Powder, Breath Activated Inhale 1 puff into the lungs every 12 (twelve) hours. • Fluticasone Propionate (FLONASE NA) 2 sprays by Nasal route daily. • Calcium Citrate-Vitamin D (CITRACAL + D OR) Take 1 tablet by mouth daily.        • Multiple Vitam behavioral problems, sleep disturbance, decreased concentration and agitation. Negative for suicidal ideas. The patient is nervous/anxious and is hyperactive. EXAM:   There were no vitals taken for this visit.  Estimated body mass index is 32.12 kg/m adenopathy. Neurological: She is alert and oriented to person, place, and time. She displays normal reflexes. A cranial nerve deficit and motor deficit is present. No sensory deficit. Coordination abnormal. Gait normal.   Skin: Skin is warm and dry.  No l

## 2020-11-17 ENCOUNTER — INITIAL APN SNF VISIT (OUTPATIENT)
Dept: INTERNAL MEDICINE CLINIC | Age: 65
End: 2020-11-17

## 2020-11-17 VITALS
SYSTOLIC BLOOD PRESSURE: 118 MMHG | TEMPERATURE: 97 F | HEART RATE: 68 BPM | RESPIRATION RATE: 16 BRPM | OXYGEN SATURATION: 97 % | DIASTOLIC BLOOD PRESSURE: 73 MMHG

## 2020-11-17 DIAGNOSIS — J44.9 CHRONIC OBSTRUCTIVE PULMONARY DISEASE, UNSPECIFIED COPD TYPE (HCC): ICD-10-CM

## 2020-11-17 DIAGNOSIS — K27.9 PUD (PEPTIC ULCER DISEASE): ICD-10-CM

## 2020-11-17 DIAGNOSIS — R53.1 WEAKNESS GENERALIZED: ICD-10-CM

## 2020-11-17 DIAGNOSIS — F10.10 ETOH ABUSE: ICD-10-CM

## 2020-11-17 DIAGNOSIS — M16.11 ARTHRITIS OF RIGHT HIP: Primary | ICD-10-CM

## 2020-11-17 DIAGNOSIS — F17.200 NICOTINE DEPENDENCE, UNCOMPLICATED, UNSPECIFIED NICOTINE PRODUCT TYPE: ICD-10-CM

## 2020-11-17 DIAGNOSIS — Z74.09 IMPAIRED MOBILITY AND ADLS: ICD-10-CM

## 2020-11-17 DIAGNOSIS — Z78.9 IMPAIRED MOBILITY AND ADLS: ICD-10-CM

## 2020-11-17 DIAGNOSIS — J45.40 MODERATE PERSISTENT ASTHMA WITHOUT COMPLICATION: ICD-10-CM

## 2020-11-17 DIAGNOSIS — Z96.641 S/P HIP REPLACEMENT, RIGHT: ICD-10-CM

## 2020-11-17 DIAGNOSIS — F31.10 BIPOLAR AFFECTIVE DISORDER, CURRENT EPISODE MANIC, CURRENT EPISODE SEVERITY UNSPECIFIED (HCC): ICD-10-CM

## 2020-11-17 PROCEDURE — 99310 SBSQ NF CARE HIGH MDM 45: CPT | Performed by: NURSE PRACTITIONER

## 2020-11-17 RX ORDER — NICOTINE 21 MG/24HR
1 PATCH, TRANSDERMAL 24 HOURS TRANSDERMAL EVERY 24 HOURS
COMMUNITY

## 2020-11-17 RX ORDER — HYDROCODONE BITARTRATE AND ACETAMINOPHEN 10; 325 MG/1; MG/1
1-2 TABLET ORAL EVERY 6 HOURS PRN
COMMUNITY

## 2020-11-17 RX ORDER — OMEPRAZOLE 20 MG/1
20 CAPSULE, DELAYED RELEASE ORAL
COMMUNITY
Start: 2020-11-18

## 2020-11-17 RX ORDER — BUTALBITAL, ACETAMINOPHEN AND CAFFEINE 300; 40; 50 MG/1; MG/1; MG/1
1 CAPSULE ORAL EVERY 4 HOURS PRN
COMMUNITY

## 2020-11-17 RX ORDER — LIDOCAINE 50 MG/G
2 PATCH TOPICAL EVERY 24 HOURS
COMMUNITY

## 2020-11-17 NOTE — PROGRESS NOTES
Rajiv Awan  : 1955  Age 77year old  female patient is admitted to Facility: [facility] for  [address] date:    2020  Discharge date to [facility]:    2020  ELOS:    18-21 days  Anticipated discharge date: Right 11/9/2020    Performed by Mikala Peralta MD at [facility] MAIN OR   • KNEE SURGERY      left x 2 and right x 1   • OTHER Bilateral     salpingo oophorectomy   • OTHER Right     wrist ganglion cyst removal   • REMOVAL OF OVARIAN CYST(S)       No family history mouth every morning. 10 tablet 0   • amphetamine-dextroamphetamine 30 MG Oral Tab Take 0.5 tablets (15 mg total) by mouth See Admin Instructions. Takes 15mg in the afternoon. 10 tablet 0   • lamoTRIgine 25 MG Oral Tab Take 50 mg by mouth nightly.        • l denies cough  GI: denies nausea, vomiting, constipation, diarrhea; no rectal bleeding; no heartburn  :no dysuria, urgency or frequency; no vaginal discharge; no urinary incontinence; no hematuria  MUSCULOSKELETAL:no joint complaints upper or lower extrem nerves intact II-XII, follows commands  PSYCHIATRIC: alert and oriented x 3; affect appropriate, +perseverates by discussing relationships w/ various individuals instead of answering specific questions      DIAGNOSTICS REVIEWED AT THIS VISIT:  11.16.2020 admissions during the previous year       0-1                                                                                     [0]       2-5                                                                                     [2]       >5 hrs  5. Singulair 10 mg q HS    Pulmonary nodule  1. F/B Dr Roxy Mckeon    SVT   1. VS q shift  2. Verapamil  mg q HS    PUD  1. ADD Omeprazole 20 mg daily x 30 days    OA/Spinal stenosis  1. Tylenol 500 mg; 2 tabs q 6 hrs prn  2. Norco prn as above  3.  L

## 2020-11-19 ENCOUNTER — SNF VISIT (OUTPATIENT)
Dept: INTERNAL MEDICINE CLINIC | Age: 65
End: 2020-11-19

## 2020-11-19 VITALS
RESPIRATION RATE: 16 BRPM | TEMPERATURE: 98 F | OXYGEN SATURATION: 95 % | SYSTOLIC BLOOD PRESSURE: 113 MMHG | DIASTOLIC BLOOD PRESSURE: 68 MMHG | HEART RATE: 94 BPM

## 2020-11-19 DIAGNOSIS — Z78.9 IMPAIRED MOBILITY AND ADLS: ICD-10-CM

## 2020-11-19 DIAGNOSIS — Z96.641 S/P HIP REPLACEMENT, RIGHT: ICD-10-CM

## 2020-11-19 DIAGNOSIS — R53.1 WEAKNESS GENERALIZED: ICD-10-CM

## 2020-11-19 DIAGNOSIS — Z74.09 IMPAIRED MOBILITY AND ADLS: ICD-10-CM

## 2020-11-19 DIAGNOSIS — M16.11 ARTHRITIS OF RIGHT HIP: Primary | ICD-10-CM

## 2020-11-19 PROCEDURE — 99308 SBSQ NF CARE LOW MDM 20: CPT | Performed by: NURSE PRACTITIONER

## 2020-11-19 NOTE — PROGRESS NOTES
Wilver Valdez, 6/20/1955, 77year old, female    Chief Complaint:  Patient presents with:   Follow - Up: Bipolar d/o w/ manic episode/Status post right anterior BARBARA  Weakness       Subjective:    PMH significant for Tobacco dependence, ETOH dependence, B guarding, no rebound tenderness. :Deferred  LYMPHATIC:no lymphedema  MUSCULOSKELETAL: no acute synovitis upper or lower extremity.   Weakness R/T recent hospitalization/diagnoses/sequelae; will undergo therapies to rehab and improve strength, endurance a HS     Pulmonary nodule  1. F/B Dr Nevin Palmer    SVT   1. VS q shift  2. Verapamil  mg q HS     PUD  1. Omeprazole 20 mg daily x 30 days     OA/Spinal stenosis  1. Tylenol 500 mg; 2 tabs q 6 hrs prn  2. Norco prn as above  3.  Lidocaine 5% patch; 1 to low

## 2020-11-24 ENCOUNTER — SNF VISIT (OUTPATIENT)
Dept: INTERNAL MEDICINE CLINIC | Age: 65
End: 2020-11-24

## 2020-11-24 VITALS
OXYGEN SATURATION: 97 % | HEART RATE: 82 BPM | TEMPERATURE: 97 F | SYSTOLIC BLOOD PRESSURE: 112 MMHG | DIASTOLIC BLOOD PRESSURE: 50 MMHG | RESPIRATION RATE: 18 BRPM

## 2020-11-24 DIAGNOSIS — F31.10 BIPOLAR AFFECTIVE DISORDER, CURRENT EPISODE MANIC, CURRENT EPISODE SEVERITY UNSPECIFIED (HCC): ICD-10-CM

## 2020-11-24 DIAGNOSIS — R53.1 WEAKNESS GENERALIZED: ICD-10-CM

## 2020-11-24 DIAGNOSIS — Z74.09 IMPAIRED MOBILITY AND ADLS: ICD-10-CM

## 2020-11-24 DIAGNOSIS — Z78.9 IMPAIRED MOBILITY AND ADLS: ICD-10-CM

## 2020-11-24 DIAGNOSIS — Z96.641 S/P HIP REPLACEMENT, RIGHT: ICD-10-CM

## 2020-11-24 DIAGNOSIS — M16.11 ARTHRITIS OF RIGHT HIP: Primary | ICD-10-CM

## 2020-11-24 PROCEDURE — 99309 SBSQ NF CARE MODERATE MDM 30: CPT | Performed by: NURSE PRACTITIONER

## 2020-11-24 NOTE — PROGRESS NOTES
Stacy Sevilla, 6/20/1955, 77year old, female    Chief Complaint:  Patient presents with:   Follow - Up: Bipolar d/o w/ manic episode/Status post right anterior BARBARA  Weakness  Psychiatric Problem       Subjective:    PMH significant for Tobacco dependenc normocephalic; normal nose, no nasal drainage, mucous membranes pink, moist, pharynx no exudate, no visible cerumen.   NECK: supple; FROM; no JVD, no TMG, no carotid bruits  BREAST: ---deferred  RESPIRATORY:clear to auscultation anteriorly and posteriorly; patch daily      ETOH dependence  1. May  NOT have ETOH  2. Monitor for s/sxs of withdrawal  3. MVI daily     Migraine  1. Imitrex 50 mg q 24 hrs prn  2. Fioricet 50/300/40 mg q 4 hrs prn     ADD  1.  Adderall 30 mg q AM and 15 mg q PM--changed to 15 mg BID

## 2020-12-01 ENCOUNTER — SNF VISIT (OUTPATIENT)
Dept: INTERNAL MEDICINE CLINIC | Age: 65
End: 2020-12-01

## 2020-12-01 VITALS
DIASTOLIC BLOOD PRESSURE: 73 MMHG | TEMPERATURE: 97 F | RESPIRATION RATE: 18 BRPM | SYSTOLIC BLOOD PRESSURE: 130 MMHG | OXYGEN SATURATION: 97 % | HEART RATE: 93 BPM

## 2020-12-01 DIAGNOSIS — M16.11 ARTHRITIS OF RIGHT HIP: Primary | ICD-10-CM

## 2020-12-01 DIAGNOSIS — Z96.641 S/P HIP REPLACEMENT, RIGHT: ICD-10-CM

## 2020-12-01 DIAGNOSIS — K64.9 HEMORRHOIDS, UNSPECIFIED HEMORRHOID TYPE: ICD-10-CM

## 2020-12-01 DIAGNOSIS — M17.12 PRIMARY OSTEOARTHRITIS OF LEFT KNEE: ICD-10-CM

## 2020-12-01 DIAGNOSIS — K59.01 SLOW TRANSIT CONSTIPATION: ICD-10-CM

## 2020-12-01 DIAGNOSIS — F31.10 BIPOLAR AFFECTIVE DISORDER, CURRENT EPISODE MANIC, CURRENT EPISODE SEVERITY UNSPECIFIED (HCC): ICD-10-CM

## 2020-12-01 PROCEDURE — 99309 SBSQ NF CARE MODERATE MDM 30: CPT | Performed by: NURSE PRACTITIONER

## 2020-12-01 RX ORDER — HYDROCORTISONE 25 MG/G
CREAM TOPICAL 2 TIMES DAILY PRN
COMMUNITY

## 2020-12-01 RX ORDER — POLYETHYLENE GLYCOL 3350 17 G/17G
17 POWDER, FOR SOLUTION ORAL DAILY PRN
COMMUNITY

## 2020-12-01 NOTE — PROGRESS NOTES
Hardik Collins, 6/20/1955, 77year old, female    Chief Complaint:  Patient presents with:   Follow - Up: Bipolar d/o w/ manic episode/Status post right anterior BARBARA  Derm Problem: \"I have a split lip\"  Constipation  Hemorrhoids  Musculoskeletal Problem supple; FROM; no JVD, no TMG, no carotid bruits  BREAST: ---deferred  RESPIRATORY:clear to auscultation anteriorly and posteriorly; no wheezing, no accessory muscle use, on room air  CARDIOVASCULAR: S1, S2 normal, RRR; no S3, no S4; , no click, no murmur Niacin    Constipation  1. Colace 100 mg BID  2. ADD Miralax 17 gm daily prn--give first dose today     Hemorrhoids  1. ADD Anusol HC crm 2.5% BID prn    Tobacco dependence  1. Nicotine 14 mg patch daily      ETOH dependence  1. May  NOT have ETOH  2.  Goldie

## 2020-12-03 ENCOUNTER — SNF VISIT (OUTPATIENT)
Dept: INTERNAL MEDICINE CLINIC | Age: 65
End: 2020-12-03

## 2020-12-03 VITALS
SYSTOLIC BLOOD PRESSURE: 124 MMHG | RESPIRATION RATE: 16 BRPM | DIASTOLIC BLOOD PRESSURE: 78 MMHG | HEART RATE: 96 BPM | TEMPERATURE: 97 F | OXYGEN SATURATION: 96 %

## 2020-12-03 DIAGNOSIS — Z78.9 IMPAIRED MOBILITY AND ADLS: ICD-10-CM

## 2020-12-03 DIAGNOSIS — R53.1 WEAKNESS GENERALIZED: ICD-10-CM

## 2020-12-03 DIAGNOSIS — Z96.641 S/P HIP REPLACEMENT, RIGHT: ICD-10-CM

## 2020-12-03 DIAGNOSIS — M54.2 NECK PAIN, CHRONIC: ICD-10-CM

## 2020-12-03 DIAGNOSIS — G89.29 NECK PAIN, CHRONIC: ICD-10-CM

## 2020-12-03 DIAGNOSIS — Z74.09 IMPAIRED MOBILITY AND ADLS: ICD-10-CM

## 2020-12-03 DIAGNOSIS — M16.11 ARTHRITIS OF RIGHT HIP: Primary | ICD-10-CM

## 2020-12-03 DIAGNOSIS — F31.10 BIPOLAR AFFECTIVE DISORDER, CURRENT EPISODE MANIC, CURRENT EPISODE SEVERITY UNSPECIFIED (HCC): ICD-10-CM

## 2020-12-03 DIAGNOSIS — E55.9 VITAMIN D DEFICIENCY: ICD-10-CM

## 2020-12-03 PROCEDURE — 99309 SBSQ NF CARE MODERATE MDM 30: CPT | Performed by: NURSE PRACTITIONER

## 2020-12-03 RX ORDER — CYCLOBENZAPRINE HCL 5 MG
5 TABLET ORAL DAILY
COMMUNITY
Start: 2020-12-04 | End: 2020-12-08

## 2020-12-03 RX ORDER — CALCIUM CARBONATE/VITAMIN D3 600 MG-10
1 TABLET ORAL DAILY
COMMUNITY

## 2020-12-03 NOTE — PROGRESS NOTES
Clint Stern, 6/20/1955, 77year old, female    Chief Complaint:  Patient presents with:   Follow - Up: Bipolar d/o w/ manic episode/Status post right anterior BARBARA  Neck Pain  Weakness       Subjective:    PMH significant for Tobacco dependence, ETOH de auscultation anteriorly and posteriorly; no wheezing, no accessory muscle use, on room air  CARDIOVASCULAR: S1, S2 normal, RRR; no S3, no S4; , no click, no murmur  ABDOMEN:  normal active BS+, soft, nondistended; no organomegaly, no masses; no bruits; non daily  2. Recheck Vitamin D in ~6 wks    Cracked skin at lip  1. MVI daily  2. Check Vitamin D, Vitamin O50, Folic acid, and Niacin    Constipation  1. Colace 100 mg BID  2. Miralax 17 gm daily prn     Hemorrhoids  1.  Anusol HC crm 2.5% BID prn    Tobacco

## 2020-12-08 ENCOUNTER — SNF VISIT (OUTPATIENT)
Dept: INTERNAL MEDICINE CLINIC | Age: 65
End: 2020-12-08

## 2020-12-08 VITALS
DIASTOLIC BLOOD PRESSURE: 80 MMHG | HEART RATE: 104 BPM | SYSTOLIC BLOOD PRESSURE: 139 MMHG | OXYGEN SATURATION: 95 % | TEMPERATURE: 98 F | RESPIRATION RATE: 18 BRPM

## 2020-12-08 DIAGNOSIS — M16.11 ARTHRITIS OF RIGHT HIP: Primary | ICD-10-CM

## 2020-12-08 DIAGNOSIS — Z96.641 S/P HIP REPLACEMENT, RIGHT: ICD-10-CM

## 2020-12-08 DIAGNOSIS — F31.10 BIPOLAR AFFECTIVE DISORDER, CURRENT EPISODE MANIC, CURRENT EPISODE SEVERITY UNSPECIFIED (HCC): ICD-10-CM

## 2020-12-08 PROCEDURE — 99308 SBSQ NF CARE LOW MDM 20: CPT | Performed by: NURSE PRACTITIONER

## 2020-12-08 NOTE — PROGRESS NOTES
Jimmy Duran, 6/20/1955, 77year old, female    Chief Complaint:  Patient presents with:   Follow - Up: Bipolar d/o w/ manic episode/Status post right anterior BARBARA       Subjective:    PMH significant for Tobacco dependence, ETOH dependence, Bipolar aff nondistended; no organomegaly, no masses; no bruits; nontender, no guarding, no rebound tenderness. :Deferred  LYMPHATIC:no lymphedema  MUSCULOSKELETAL: no acute synovitis upper or lower extremity.   Weakness R/T recent hospitalization/diagnoses/sequelae dependence  1. May  NOT have ETOH  2. Monitor for s/sxs of withdrawal  3. MVI daily     Migraine  1. Imitrex 50 mg q 24 hrs prn  2. Fioricet 50/300/40 mg q 4 hrs prn     ADD  1.  Adderall 30 mg q AM and 15 mg q PM--changed to 15 mg BID per pt request     As

## 2020-12-10 ENCOUNTER — SNF VISIT (OUTPATIENT)
Dept: INTERNAL MEDICINE CLINIC | Age: 65
End: 2020-12-10

## 2020-12-10 VITALS
OXYGEN SATURATION: 97 % | HEART RATE: 103 BPM | RESPIRATION RATE: 18 BRPM | SYSTOLIC BLOOD PRESSURE: 127 MMHG | DIASTOLIC BLOOD PRESSURE: 67 MMHG | TEMPERATURE: 98 F

## 2020-12-10 DIAGNOSIS — Z74.09 IMPAIRED MOBILITY AND ADLS: ICD-10-CM

## 2020-12-10 DIAGNOSIS — K59.01 SLOW TRANSIT CONSTIPATION: ICD-10-CM

## 2020-12-10 DIAGNOSIS — M16.11 ARTHRITIS OF RIGHT HIP: Primary | ICD-10-CM

## 2020-12-10 DIAGNOSIS — R60.0 LOCALIZED EDEMA: ICD-10-CM

## 2020-12-10 DIAGNOSIS — Z78.9 IMPAIRED MOBILITY AND ADLS: ICD-10-CM

## 2020-12-10 DIAGNOSIS — R53.1 WEAKNESS GENERALIZED: ICD-10-CM

## 2020-12-10 DIAGNOSIS — R21 RASH IN ADULT: ICD-10-CM

## 2020-12-10 DIAGNOSIS — Z96.641 S/P HIP REPLACEMENT, RIGHT: ICD-10-CM

## 2020-12-10 PROCEDURE — 99310 SBSQ NF CARE HIGH MDM 45: CPT | Performed by: NURSE PRACTITIONER

## 2020-12-10 NOTE — PROGRESS NOTES
Concepcion Escobar, 6/20/1955, 77year old, female    Chief Complaint:  Patient presents with:   Follow - Up: Bipolar d/o w/ manic episode/Status post right anterior BARBARA  Derm Problem: redness/warmth on right thigh  Constipation  Weakness       Subjective: ---deferred  RESPIRATORY:clear to auscultation anteriorly and posteriorly; no wheezing, no accessory muscle use, on room air  CARDIOVASCULAR: S1, S2 normal, RRR; no S3, no S4; , no click, no murmur  ABDOMEN:  normal active BS+, soft, nondistended; no organ neck/shoulder pain  1. Flexeril 5 mg daily x 5 days/until 12.9.2020  2. Voltaren 1% gel, 2 gm TID    Vitamin D deficiency  1. Calcium plus Vitamin D 600mg/400u daily  2. Recheck Vitamin D in ~6 wks    Cracked skin at lip--Resolved  1.  MVI daily    Constipa

## 2020-12-12 ENCOUNTER — HOSPITAL ENCOUNTER (EMERGENCY)
Facility: HOSPITAL | Age: 65
Discharge: HOME | End: 2020-12-12
Attending: EMERGENCY MEDICINE
Payer: MEDICARE

## 2020-12-12 ENCOUNTER — APPOINTMENT (OUTPATIENT)
Dept: GENERAL RADIOLOGY | Facility: HOSPITAL | Age: 65
End: 2020-12-12
Attending: EMERGENCY MEDICINE
Payer: MEDICARE

## 2020-12-12 VITALS
TEMPERATURE: 97 F | SYSTOLIC BLOOD PRESSURE: 122 MMHG | HEART RATE: 102 BPM | DIASTOLIC BLOOD PRESSURE: 72 MMHG | RESPIRATION RATE: 22 BRPM

## 2020-12-12 DIAGNOSIS — T14.8XXA HEMATOMA: Primary | ICD-10-CM

## 2020-12-12 DIAGNOSIS — Z96.641 S/P HIP REPLACEMENT, RIGHT: ICD-10-CM

## 2020-12-12 PROCEDURE — 36415 COLL VENOUS BLD VENIPUNCTURE: CPT

## 2020-12-12 PROCEDURE — 80053 COMPREHEN METABOLIC PANEL: CPT | Performed by: EMERGENCY MEDICINE

## 2020-12-12 PROCEDURE — 85025 COMPLETE CBC W/AUTO DIFF WBC: CPT | Performed by: EMERGENCY MEDICINE

## 2020-12-12 PROCEDURE — 85652 RBC SED RATE AUTOMATED: CPT | Performed by: EMERGENCY MEDICINE

## 2020-12-12 PROCEDURE — 99285 EMERGENCY DEPT VISIT HI MDM: CPT

## 2020-12-12 PROCEDURE — 99284 EMERGENCY DEPT VISIT MOD MDM: CPT

## 2020-12-12 PROCEDURE — 73502 X-RAY EXAM HIP UNI 2-3 VIEWS: CPT | Performed by: EMERGENCY MEDICINE

## 2020-12-12 NOTE — ED PROVIDER NOTES
Patient Seen in: [facility] Emergency Department      History   Patient presents with:  Postop/Procedure Problem    Stated Complaint: R hip surgery, diagnosed with hematoma    HPI    69-year-old female presents emergency department complaining of ri Tympanic   SpO2    O2 Device        Current:/72   Pulse 102   Temp 97.3 °F (36.3 °C) (Tympanic)   Resp 22         Physical Exam    All measures to prevent infection transmission during my interaction with the patient were taken.  The patient was alrea orders were created for panel order CBC WITH DIFFERENTIAL WITH PLATELET.   Procedure                               Abnormality         Status                     ---------                               -----------         ------                     CBC W/ D Finalized by (CST): Avril Valenzuela MD on 12/12/2020 at 4:31 PM        I personally reviewed the images myself and went over the results with the patient. Patient will follow up with orthopedics.   Ice affected side and return if any worsening problem

## 2021-01-30 ENCOUNTER — APPOINTMENT (OUTPATIENT)
Dept: ULTRASOUND IMAGING | Age: 66
End: 2021-01-30
Attending: PHYSICIAN ASSISTANT
Payer: MEDICARE

## 2021-01-30 ENCOUNTER — APPOINTMENT (OUTPATIENT)
Dept: GENERAL RADIOLOGY | Age: 66
End: 2021-01-30
Attending: PHYSICIAN ASSISTANT
Payer: MEDICARE

## 2021-01-30 ENCOUNTER — HOSPITAL ENCOUNTER (OUTPATIENT)
Age: 66
Discharge: HOME | End: 2021-01-30
Payer: MEDICARE

## 2021-01-30 VITALS
BODY MASS INDEX: 31 KG/M2 | HEART RATE: 83 BPM | SYSTOLIC BLOOD PRESSURE: 119 MMHG | RESPIRATION RATE: 16 BRPM | OXYGEN SATURATION: 98 % | DIASTOLIC BLOOD PRESSURE: 61 MMHG | WEIGHT: 185 LBS | TEMPERATURE: 97 F

## 2021-01-30 DIAGNOSIS — Z20.822 EXPOSURE TO COVID-19 VIRUS: ICD-10-CM

## 2021-01-30 DIAGNOSIS — M25.551 RIGHT HIP PAIN: Primary | ICD-10-CM

## 2021-01-30 DIAGNOSIS — M79.89 SWELLING OF RIGHT LOWER EXTREMITY: ICD-10-CM

## 2021-01-30 LAB — SARS-COV-2 RNA RESP QL NAA+PROBE: NOT DETECTED

## 2021-01-30 PROCEDURE — 93971 EXTREMITY STUDY: CPT | Performed by: PHYSICIAN ASSISTANT

## 2021-01-30 PROCEDURE — 73502 X-RAY EXAM HIP UNI 2-3 VIEWS: CPT | Performed by: PHYSICIAN ASSISTANT

## 2021-01-30 PROCEDURE — 99214 OFFICE O/P EST MOD 30 MIN: CPT

## 2021-01-30 NOTE — ED PROVIDER NOTES
Patient Seen in: [facility]      History   Patient presents with:  Hip Pain    Stated Complaint: COVID TEST    HPI/Subjective:   HPI    24-year-old female who comes in today for COVID-19 testing she states that she was exposed on January Review of Systems    Positive for stated complaint: COVID TEST  Other systems are as noted in HPI. Constitutional and vital signs reviewed. All other systems reviewed and negative except as noted above.     Physical Exam     ED Triage Vitals [01 ED Course     Labs Reviewed   RAPID SARS-COV-2 BY PCR - Normal          Us Venous Doppler Leg Right - Diag Img (cpt=93971)    Result Date: 1/30/2021  PROCEDURE:  US VENOUS DOPPLER LEG RIGHT - DIAG IMG (CPT=93971)  COMPARISON:  None.   INDICATION Stable intact right hip prosthesis.    Dictated by (CST): Melissa Fontaine MD on 1/30/2021 at 3:21 PM     Finalized by (CST): Melissa Fontaine MD on 1/30/2021 at 3:22 PM            MDM      Reviewed Dr. Astrid Cervantes surgical operative report and also his notes from [facility]

## 2021-01-30 NOTE — ED INITIAL ASSESSMENT (HPI)
Patient reports she had a positive exposure on 1/19. Patient reports she has been in quarantine. Patient reports she needs another test so she can go to the doctor to have her leg checked.   Patient reports she had a hip replacement on Nov. 9th and is hav

## 2021-04-06 PROBLEM — M54.16 LUMBAR RADICULITIS: Status: ACTIVE | Noted: 2021-04-06

## 2024-06-12 RX ORDER — ZOLMITRIPTAN 2.5 MG/1
2.5 TABLET, FILM COATED ORAL SEE ADMIN INSTRUCTIONS
COMMUNITY

## 2024-06-12 RX ORDER — LAMOTRIGINE 150 MG/1
300 TABLET ORAL NIGHTLY
COMMUNITY

## 2024-06-12 RX ORDER — MELOXICAM 15 MG/1
1 TABLET ORAL DAILY
COMMUNITY
End: 2024-07-09

## 2024-06-12 RX ORDER — ATORVASTATIN CALCIUM 10 MG/1
10 TABLET, FILM COATED ORAL NIGHTLY
COMMUNITY

## 2024-06-24 ENCOUNTER — EKG ENCOUNTER (OUTPATIENT)
Dept: LAB | Age: 69
End: 2024-06-24
Attending: ORTHOPAEDIC SURGERY

## 2024-06-24 ENCOUNTER — LABORATORY ENCOUNTER (OUTPATIENT)
Dept: LAB | Age: 69
End: 2024-06-24
Attending: ORTHOPAEDIC SURGERY

## 2024-06-24 DIAGNOSIS — Z01.818 PRE-OP TESTING: ICD-10-CM

## 2024-06-24 LAB
ALBUMIN SERPL-MCNC: 3.7 G/DL (ref 3.4–5)
ALBUMIN/GLOB SERPL: 1.2 {RATIO} (ref 1–2)
ALP LIVER SERPL-CCNC: 95 U/L
ALT SERPL-CCNC: 41 U/L
ANION GAP SERPL CALC-SCNC: 5 MMOL/L (ref 0–18)
ANTIBODY SCREEN: NEGATIVE
AST SERPL-CCNC: 30 U/L (ref 15–37)
ATRIAL RATE: 69 BPM
BASOPHILS # BLD AUTO: 0.06 X10(3) UL (ref 0–0.2)
BASOPHILS NFR BLD AUTO: 0.7 %
BILIRUB SERPL-MCNC: 0.6 MG/DL (ref 0.1–2)
BUN BLD-MCNC: 17 MG/DL (ref 9–23)
CALCIUM BLD-MCNC: 9.2 MG/DL (ref 8.5–10.1)
CHLORIDE SERPL-SCNC: 112 MMOL/L (ref 98–112)
CO2 SERPL-SCNC: 26 MMOL/L (ref 21–32)
CREAT BLD-MCNC: 1.04 MG/DL
EGFRCR SERPLBLD CKD-EPI 2021: 57 ML/MIN/1.73M2 (ref 60–?)
EOSINOPHIL # BLD AUTO: 0.31 X10(3) UL (ref 0–0.7)
EOSINOPHIL NFR BLD AUTO: 3.4 %
ERYTHROCYTE [DISTWIDTH] IN BLOOD BY AUTOMATED COUNT: 11.8 %
FASTING STATUS PATIENT QL REPORTED: YES
GLOBULIN PLAS-MCNC: 3.2 G/DL (ref 2.8–4.4)
GLUCOSE BLD-MCNC: 107 MG/DL (ref 70–99)
HCT VFR BLD AUTO: 41.5 %
HGB BLD-MCNC: 14.3 G/DL
IMM GRANULOCYTES # BLD AUTO: 0.03 X10(3) UL (ref 0–1)
IMM GRANULOCYTES NFR BLD: 0.3 %
LYMPHOCYTES # BLD AUTO: 3.02 X10(3) UL (ref 1–4)
LYMPHOCYTES NFR BLD AUTO: 33.4 %
MCH RBC QN AUTO: 31.2 PG (ref 26–34)
MCHC RBC AUTO-ENTMCNC: 34.5 G/DL (ref 31–37)
MCV RBC AUTO: 90.6 FL
MONOCYTES # BLD AUTO: 0.73 X10(3) UL (ref 0.1–1)
MONOCYTES NFR BLD AUTO: 8.1 %
NEUTROPHILS # BLD AUTO: 4.9 X10 (3) UL (ref 1.5–7.7)
NEUTROPHILS # BLD AUTO: 4.9 X10(3) UL (ref 1.5–7.7)
NEUTROPHILS NFR BLD AUTO: 54.1 %
OSMOLALITY SERPL CALC.SUM OF ELEC: 298 MOSM/KG (ref 275–295)
P AXIS: -22 DEGREES
P-R INTERVAL: 134 MS
PLATELET # BLD AUTO: 273 10(3)UL (ref 150–450)
POTASSIUM SERPL-SCNC: 4.1 MMOL/L (ref 3.5–5.1)
PROT SERPL-MCNC: 6.9 G/DL (ref 6.4–8.2)
Q-T INTERVAL: 428 MS
QRS DURATION: 88 MS
QTC CALCULATION (BEZET): 458 MS
R AXIS: 8 DEGREES
RBC # BLD AUTO: 4.58 X10(6)UL
RH BLOOD TYPE: NEGATIVE
SODIUM SERPL-SCNC: 143 MMOL/L (ref 136–145)
T AXIS: 44 DEGREES
VENTRICULAR RATE: 69 BPM
WBC # BLD AUTO: 9.1 X10(3) UL (ref 4–11)

## 2024-06-24 PROCEDURE — 86901 BLOOD TYPING SEROLOGIC RH(D): CPT

## 2024-06-24 PROCEDURE — 87081 CULTURE SCREEN ONLY: CPT

## 2024-06-24 PROCEDURE — 80053 COMPREHEN METABOLIC PANEL: CPT

## 2024-06-24 PROCEDURE — 93010 ELECTROCARDIOGRAM REPORT: CPT | Performed by: INTERNAL MEDICINE

## 2024-06-24 PROCEDURE — 86850 RBC ANTIBODY SCREEN: CPT

## 2024-06-24 PROCEDURE — 86900 BLOOD TYPING SEROLOGIC ABO: CPT

## 2024-06-24 PROCEDURE — 36415 COLL VENOUS BLD VENIPUNCTURE: CPT

## 2024-06-24 PROCEDURE — 85025 COMPLETE CBC W/AUTO DIFF WBC: CPT

## 2024-06-24 PROCEDURE — 93005 ELECTROCARDIOGRAM TRACING: CPT

## 2024-07-08 ENCOUNTER — APPOINTMENT (OUTPATIENT)
Dept: GENERAL RADIOLOGY | Facility: HOSPITAL | Age: 69
End: 2024-07-08
Attending: PHYSICIAN ASSISTANT
Payer: MEDICARE

## 2024-07-08 ENCOUNTER — ANESTHESIA (OUTPATIENT)
Dept: SURGERY | Facility: HOSPITAL | Age: 69
End: 2024-07-08
Payer: MEDICARE

## 2024-07-08 ENCOUNTER — ANESTHESIA EVENT (OUTPATIENT)
Dept: SURGERY | Facility: HOSPITAL | Age: 69
End: 2024-07-08
Payer: MEDICARE

## 2024-07-08 ENCOUNTER — HOSPITAL ENCOUNTER (OUTPATIENT)
Facility: HOSPITAL | Age: 69
Discharge: HOME HEALTH | End: 2024-07-09
Attending: ORTHOPAEDIC SURGERY | Admitting: ORTHOPAEDIC SURGERY
Payer: MEDICARE

## 2024-07-08 DIAGNOSIS — M17.12 PRIMARY OSTEOARTHRITIS OF LEFT KNEE: Primary | ICD-10-CM

## 2024-07-08 DIAGNOSIS — Z01.818 PRE-OP TESTING: ICD-10-CM

## 2024-07-08 PROCEDURE — 97161 PT EVAL LOW COMPLEX 20 MIN: CPT

## 2024-07-08 PROCEDURE — 88305 TISSUE EXAM BY PATHOLOGIST: CPT | Performed by: ORTHOPAEDIC SURGERY

## 2024-07-08 PROCEDURE — 97530 THERAPEUTIC ACTIVITIES: CPT

## 2024-07-08 PROCEDURE — 73560 X-RAY EXAM OF KNEE 1 OR 2: CPT | Performed by: PHYSICIAN ASSISTANT

## 2024-07-08 PROCEDURE — 0SRD0J9 REPLACEMENT OF LEFT KNEE JOINT WITH SYNTHETIC SUBSTITUTE, CEMENTED, OPEN APPROACH: ICD-10-PCS | Performed by: ORTHOPAEDIC SURGERY

## 2024-07-08 PROCEDURE — 88311 DECALCIFY TISSUE: CPT | Performed by: ORTHOPAEDIC SURGERY

## 2024-07-08 DEVICE — ATTUNE PATELLA MEDIALIZED DOME 35MM CEMENTED AOX
Type: IMPLANTABLE DEVICE | Site: KNEE | Status: FUNCTIONAL
Brand: ATTUNE

## 2024-07-08 DEVICE — ATTUNE KNEE SYSTEM TIBIAL INSERT FIXED BEARING MEDIAL STABILIZED LEFT AOX 5, 6MM
Type: IMPLANTABLE DEVICE | Site: KNEE | Status: FUNCTIONAL
Brand: ATTUNE

## 2024-07-08 DEVICE — SMARTSET HIGH PERFORMANCE MV MEDIUM VISCOSITY BONE CEMENT 40G
Type: IMPLANTABLE DEVICE | Site: KNEE | Status: FUNCTIONAL
Brand: SMARTSET

## 2024-07-08 DEVICE — ATTUNE KNEE SYSTEM FEMORAL CRUCIATE RETAINING NARROW SIZE 5N LEFT CEMENTED
Type: IMPLANTABLE DEVICE | Site: KNEE | Status: FUNCTIONAL
Brand: ATTUNE

## 2024-07-08 DEVICE — ATTUNE KNEE SYSTEM TIBIAL BASE FIXED BEARING SIZE 4 CEMENTED
Type: IMPLANTABLE DEVICE | Site: KNEE | Status: FUNCTIONAL
Brand: ATTUNE

## 2024-07-08 RX ORDER — HYDROMORPHONE HYDROCHLORIDE 1 MG/ML
0.6 INJECTION, SOLUTION INTRAMUSCULAR; INTRAVENOUS; SUBCUTANEOUS EVERY 5 MIN PRN
Status: DISCONTINUED | OUTPATIENT
Start: 2024-07-08 | End: 2024-07-08 | Stop reason: HOSPADM

## 2024-07-08 RX ORDER — FAMOTIDINE 10 MG/ML
20 INJECTION, SOLUTION INTRAVENOUS 2 TIMES DAILY
Status: DISCONTINUED | OUTPATIENT
Start: 2024-07-08 | End: 2024-07-09

## 2024-07-08 RX ORDER — HYDROMORPHONE HYDROCHLORIDE 1 MG/ML
INJECTION, SOLUTION INTRAMUSCULAR; INTRAVENOUS; SUBCUTANEOUS AS NEEDED
Status: DISCONTINUED | OUTPATIENT
Start: 2024-07-08 | End: 2024-07-08 | Stop reason: SURG

## 2024-07-08 RX ORDER — CELECOXIB 200 MG/1
200 CAPSULE ORAL DAILY
Qty: 30 CAPSULE | Refills: 0 | Status: SHIPPED | OUTPATIENT
Start: 2024-07-08

## 2024-07-08 RX ORDER — DEXAMETHASONE SODIUM PHOSPHATE 10 MG/ML
8 INJECTION, SOLUTION INTRAMUSCULAR; INTRAVENOUS ONCE
Status: COMPLETED | OUTPATIENT
Start: 2024-07-09 | End: 2024-07-09

## 2024-07-08 RX ORDER — ACETAMINOPHEN 500 MG
1000 TABLET ORAL ONCE
Status: DISCONTINUED | OUTPATIENT
Start: 2024-07-08 | End: 2024-07-08 | Stop reason: HOSPADM

## 2024-07-08 RX ORDER — DOCUSATE SODIUM 100 MG/1
100 CAPSULE, LIQUID FILLED ORAL 2 TIMES DAILY
Status: SHIPPED | COMMUNITY

## 2024-07-08 RX ORDER — ASPIRIN 81 MG/1
81 TABLET ORAL 2 TIMES DAILY
Status: SHIPPED | COMMUNITY

## 2024-07-08 RX ORDER — ASPIRIN 81 MG/1
81 TABLET ORAL 2 TIMES DAILY
Status: DISCONTINUED | OUTPATIENT
Start: 2024-07-08 | End: 2024-07-09

## 2024-07-08 RX ORDER — KETOROLAC TROMETHAMINE 30 MG/ML
30 INJECTION, SOLUTION INTRAMUSCULAR; INTRAVENOUS ONCE
Status: DISCONTINUED | OUTPATIENT
Start: 2024-07-08 | End: 2024-07-08 | Stop reason: HOSPADM

## 2024-07-08 RX ORDER — LIDOCAINE HYDROCHLORIDE 10 MG/ML
INJECTION, SOLUTION EPIDURAL; INFILTRATION; INTRACAUDAL; PERINEURAL AS NEEDED
Status: DISCONTINUED | OUTPATIENT
Start: 2024-07-08 | End: 2024-07-08 | Stop reason: SURG

## 2024-07-08 RX ORDER — MONTELUKAST SODIUM 10 MG/1
10 TABLET ORAL NIGHTLY
Status: DISCONTINUED | OUTPATIENT
Start: 2024-07-08 | End: 2024-07-09

## 2024-07-08 RX ORDER — OXYCODONE HYDROCHLORIDE 5 MG/1
5 TABLET ORAL EVERY 4 HOURS PRN
Status: DISCONTINUED | OUTPATIENT
Start: 2024-07-08 | End: 2024-07-09

## 2024-07-08 RX ORDER — METOCLOPRAMIDE HYDROCHLORIDE 5 MG/ML
10 INJECTION INTRAMUSCULAR; INTRAVENOUS EVERY 8 HOURS PRN
Status: DISCONTINUED | OUTPATIENT
Start: 2024-07-08 | End: 2024-07-09

## 2024-07-08 RX ORDER — SODIUM CHLORIDE, SODIUM LACTATE, POTASSIUM CHLORIDE, CALCIUM CHLORIDE 600; 310; 30; 20 MG/100ML; MG/100ML; MG/100ML; MG/100ML
INJECTION, SOLUTION INTRAVENOUS CONTINUOUS
Status: DISCONTINUED | OUTPATIENT
Start: 2024-07-08 | End: 2024-07-09

## 2024-07-08 RX ORDER — FLUTICASONE PROPIONATE 50 MCG
2 SPRAY, SUSPENSION (ML) NASAL DAILY
COMMUNITY

## 2024-07-08 RX ORDER — KETAMINE HYDROCHLORIDE 50 MG/ML
INJECTION, SOLUTION INTRAMUSCULAR; INTRAVENOUS AS NEEDED
Status: DISCONTINUED | OUTPATIENT
Start: 2024-07-08 | End: 2024-07-08 | Stop reason: SURG

## 2024-07-08 RX ORDER — ACETAMINOPHEN 325 MG/1
TABLET ORAL
Status: COMPLETED
Start: 2024-07-08 | End: 2024-07-08

## 2024-07-08 RX ORDER — MIDAZOLAM HYDROCHLORIDE 1 MG/ML
INJECTION INTRAMUSCULAR; INTRAVENOUS AS NEEDED
Status: DISCONTINUED | OUTPATIENT
Start: 2024-07-08 | End: 2024-07-08 | Stop reason: SURG

## 2024-07-08 RX ORDER — HYDROMORPHONE HYDROCHLORIDE 1 MG/ML
0.4 INJECTION, SOLUTION INTRAMUSCULAR; INTRAVENOUS; SUBCUTANEOUS EVERY 5 MIN PRN
Status: DISCONTINUED | OUTPATIENT
Start: 2024-07-08 | End: 2024-07-08 | Stop reason: HOSPADM

## 2024-07-08 RX ORDER — CELECOXIB 200 MG/1
200 CAPSULE ORAL DAILY
COMMUNITY
Start: 2024-07-02 | End: 2024-07-09

## 2024-07-08 RX ORDER — SENNOSIDES 8.6 MG
17.2 TABLET ORAL NIGHTLY
Status: DISCONTINUED | OUTPATIENT
Start: 2024-07-08 | End: 2024-07-09

## 2024-07-08 RX ORDER — DOCUSATE SODIUM 100 MG/1
100 CAPSULE, LIQUID FILLED ORAL 2 TIMES DAILY
Status: DISCONTINUED | OUTPATIENT
Start: 2024-07-08 | End: 2024-07-09

## 2024-07-08 RX ORDER — ACETAMINOPHEN 500 MG
1000 TABLET ORAL 4 TIMES DAILY
Status: SHIPPED | COMMUNITY

## 2024-07-08 RX ORDER — ONDANSETRON 2 MG/ML
4 INJECTION INTRAMUSCULAR; INTRAVENOUS EVERY 6 HOURS PRN
Status: DISCONTINUED | OUTPATIENT
Start: 2024-07-08 | End: 2024-07-08 | Stop reason: HOSPADM

## 2024-07-08 RX ORDER — KETOROLAC TROMETHAMINE 30 MG/ML
INJECTION, SOLUTION INTRAMUSCULAR; INTRAVENOUS AS NEEDED
Status: DISCONTINUED | OUTPATIENT
Start: 2024-07-08 | End: 2024-07-08 | Stop reason: SURG

## 2024-07-08 RX ORDER — HYDROMORPHONE HYDROCHLORIDE 1 MG/ML
0.4 INJECTION, SOLUTION INTRAMUSCULAR; INTRAVENOUS; SUBCUTANEOUS EVERY 2 HOUR PRN
Status: DISCONTINUED | OUTPATIENT
Start: 2024-07-08 | End: 2024-07-09

## 2024-07-08 RX ORDER — POLYETHYLENE GLYCOL 3350 17 G/17G
17 POWDER, FOR SOLUTION ORAL DAILY PRN
Status: DISCONTINUED | OUTPATIENT
Start: 2024-07-08 | End: 2024-07-09

## 2024-07-08 RX ORDER — KETOROLAC TROMETHAMINE 30 MG/ML
INJECTION, SOLUTION INTRAMUSCULAR; INTRAVENOUS
Status: COMPLETED
Start: 2024-07-08 | End: 2024-07-08

## 2024-07-08 RX ORDER — ALBUTEROL SULFATE 90 UG/1
1 AEROSOL, METERED RESPIRATORY (INHALATION) EVERY 6 HOURS PRN
Status: DISCONTINUED | OUTPATIENT
Start: 2024-07-08 | End: 2024-07-09

## 2024-07-08 RX ORDER — ASPIRIN 81 MG/1
81 TABLET ORAL 2 TIMES DAILY
COMMUNITY
Start: 2024-07-02 | End: 2024-07-09

## 2024-07-08 RX ORDER — METOCLOPRAMIDE HYDROCHLORIDE 5 MG/ML
10 INJECTION INTRAMUSCULAR; INTRAVENOUS EVERY 8 HOURS PRN
Status: DISCONTINUED | OUTPATIENT
Start: 2024-07-08 | End: 2024-07-08 | Stop reason: HOSPADM

## 2024-07-08 RX ORDER — SODIUM CHLORIDE, SODIUM LACTATE, POTASSIUM CHLORIDE, CALCIUM CHLORIDE 600; 310; 30; 20 MG/100ML; MG/100ML; MG/100ML; MG/100ML
INJECTION, SOLUTION INTRAVENOUS CONTINUOUS
Status: DISCONTINUED | OUTPATIENT
Start: 2024-07-08 | End: 2024-07-08 | Stop reason: HOSPADM

## 2024-07-08 RX ORDER — DIPHENHYDRAMINE HYDROCHLORIDE 50 MG/ML
12.5 INJECTION INTRAMUSCULAR; INTRAVENOUS EVERY 4 HOURS PRN
Status: DISCONTINUED | OUTPATIENT
Start: 2024-07-08 | End: 2024-07-09

## 2024-07-08 RX ORDER — TRANEXAMIC ACID 10 MG/ML
1000 INJECTION, SOLUTION INTRAVENOUS ONCE
Status: COMPLETED | OUTPATIENT
Start: 2024-07-08 | End: 2024-07-08

## 2024-07-08 RX ORDER — DEXAMETHASONE SODIUM PHOSPHATE 4 MG/ML
VIAL (ML) INJECTION AS NEEDED
Status: DISCONTINUED | OUTPATIENT
Start: 2024-07-08 | End: 2024-07-08 | Stop reason: SURG

## 2024-07-08 RX ORDER — DIPHENHYDRAMINE HCL 25 MG
25 CAPSULE ORAL EVERY 4 HOURS PRN
Status: DISCONTINUED | OUTPATIENT
Start: 2024-07-08 | End: 2024-07-09

## 2024-07-08 RX ORDER — TRAMADOL HYDROCHLORIDE 50 MG/1
1 TABLET ORAL EVERY 6 HOURS PRN
COMMUNITY
Start: 2024-07-02 | End: 2024-07-09

## 2024-07-08 RX ORDER — HYDROMORPHONE HYDROCHLORIDE 1 MG/ML
INJECTION, SOLUTION INTRAMUSCULAR; INTRAVENOUS; SUBCUTANEOUS
Status: COMPLETED
Start: 2024-07-08 | End: 2024-07-08

## 2024-07-08 RX ORDER — VERAPAMIL HYDROCHLORIDE 120 MG/1
120 TABLET, FILM COATED ORAL NIGHTLY
COMMUNITY

## 2024-07-08 RX ORDER — FAMOTIDINE 20 MG/1
20 TABLET, FILM COATED ORAL 2 TIMES DAILY
Status: DISCONTINUED | OUTPATIENT
Start: 2024-07-08 | End: 2024-07-09

## 2024-07-08 RX ORDER — ROCURONIUM BROMIDE 10 MG/ML
INJECTION, SOLUTION INTRAVENOUS AS NEEDED
Status: DISCONTINUED | OUTPATIENT
Start: 2024-07-08 | End: 2024-07-08 | Stop reason: SURG

## 2024-07-08 RX ORDER — ATORVASTATIN CALCIUM 10 MG/1
10 TABLET, FILM COATED ORAL NIGHTLY
Status: DISCONTINUED | OUTPATIENT
Start: 2024-07-08 | End: 2024-07-09

## 2024-07-08 RX ORDER — ONDANSETRON 2 MG/ML
4 INJECTION INTRAMUSCULAR; INTRAVENOUS EVERY 6 HOURS PRN
Status: DISCONTINUED | OUTPATIENT
Start: 2024-07-08 | End: 2024-07-09

## 2024-07-08 RX ORDER — BISACODYL 10 MG
10 SUPPOSITORY, RECTAL RECTAL
Status: DISCONTINUED | OUTPATIENT
Start: 2024-07-08 | End: 2024-07-09

## 2024-07-08 RX ORDER — FLUTICASONE FUROATE AND VILANTEROL 200; 25 UG/1; UG/1
1 POWDER RESPIRATORY (INHALATION) DAILY
Status: DISCONTINUED | OUTPATIENT
Start: 2024-07-09 | End: 2024-07-09

## 2024-07-08 RX ORDER — KETOROLAC TROMETHAMINE 15 MG/ML
15 INJECTION, SOLUTION INTRAMUSCULAR; INTRAVENOUS EVERY 6 HOURS
Status: DISCONTINUED | OUTPATIENT
Start: 2024-07-08 | End: 2024-07-09

## 2024-07-08 RX ORDER — ACETAMINOPHEN 500 MG
1000 TABLET ORAL 4 TIMES DAILY
COMMUNITY
Start: 2024-07-02 | End: 2024-07-09

## 2024-07-08 RX ORDER — NALOXONE HYDROCHLORIDE 0.4 MG/ML
0.08 INJECTION, SOLUTION INTRAMUSCULAR; INTRAVENOUS; SUBCUTANEOUS AS NEEDED
Status: DISCONTINUED | OUTPATIENT
Start: 2024-07-08 | End: 2024-07-08 | Stop reason: HOSPADM

## 2024-07-08 RX ORDER — TRAMADOL HYDROCHLORIDE 50 MG/1
50 TABLET ORAL EVERY 6 HOURS PRN
Status: SHIPPED | COMMUNITY

## 2024-07-08 RX ORDER — DIPHENHYDRAMINE HYDROCHLORIDE 50 MG/ML
25 INJECTION INTRAMUSCULAR; INTRAVENOUS ONCE AS NEEDED
Status: ACTIVE | OUTPATIENT
Start: 2024-07-08 | End: 2024-07-08

## 2024-07-08 RX ORDER — ONDANSETRON 2 MG/ML
INJECTION INTRAMUSCULAR; INTRAVENOUS AS NEEDED
Status: DISCONTINUED | OUTPATIENT
Start: 2024-07-08 | End: 2024-07-08 | Stop reason: SURG

## 2024-07-08 RX ORDER — OXYCODONE HYDROCHLORIDE 5 MG/1
5 TABLET ORAL EVERY 6 HOURS PRN
Status: SHIPPED | COMMUNITY

## 2024-07-08 RX ORDER — HYDROMORPHONE HYDROCHLORIDE 1 MG/ML
0.2 INJECTION, SOLUTION INTRAMUSCULAR; INTRAVENOUS; SUBCUTANEOUS EVERY 5 MIN PRN
Status: DISCONTINUED | OUTPATIENT
Start: 2024-07-08 | End: 2024-07-08 | Stop reason: HOSPADM

## 2024-07-08 RX ORDER — LIDOCAINE HYDROCHLORIDE 40 MG/ML
INJECTION, SOLUTION RETROBULBAR AS NEEDED
Status: DISCONTINUED | OUTPATIENT
Start: 2024-07-08 | End: 2024-07-08 | Stop reason: SURG

## 2024-07-08 RX ORDER — ACETAMINOPHEN 325 MG/1
650 TABLET ORAL ONCE
Status: COMPLETED | OUTPATIENT
Start: 2024-07-08 | End: 2024-07-08

## 2024-07-08 RX ORDER — HYDROMORPHONE HYDROCHLORIDE 1 MG/ML
0.2 INJECTION, SOLUTION INTRAMUSCULAR; INTRAVENOUS; SUBCUTANEOUS EVERY 2 HOUR PRN
Status: DISCONTINUED | OUTPATIENT
Start: 2024-07-08 | End: 2024-07-09

## 2024-07-08 RX ORDER — NICOTINE 21 MG/24HR
1 PATCH, TRANSDERMAL 24 HOURS TRANSDERMAL EVERY 24 HOURS
Status: DISCONTINUED | OUTPATIENT
Start: 2024-07-08 | End: 2024-07-09

## 2024-07-08 RX ORDER — ENEMA 19; 7 G/133ML; G/133ML
1 ENEMA RECTAL ONCE AS NEEDED
Status: DISCONTINUED | OUTPATIENT
Start: 2024-07-08 | End: 2024-07-09

## 2024-07-08 RX ORDER — OXYCODONE HYDROCHLORIDE 5 MG/1
1 TABLET ORAL EVERY 6 HOURS PRN
COMMUNITY
Start: 2024-07-02 | End: 2024-07-09

## 2024-07-08 RX ORDER — HYDROCODONE BITARTRATE AND ACETAMINOPHEN 5; 325 MG/1; MG/1
1 TABLET ORAL 2 TIMES DAILY PRN
COMMUNITY
End: 2024-07-09

## 2024-07-08 RX ORDER — ONDANSETRON 2 MG/ML
INJECTION INTRAMUSCULAR; INTRAVENOUS
Status: COMPLETED
Start: 2024-07-08 | End: 2024-07-08

## 2024-07-08 RX ADMIN — ROCURONIUM BROMIDE 10 MG: 10 INJECTION, SOLUTION INTRAVENOUS at 11:53:00

## 2024-07-08 RX ADMIN — LIDOCAINE HYDROCHLORIDE 2 ML: 40 INJECTION, SOLUTION RETROBULBAR at 11:55:00

## 2024-07-08 RX ADMIN — KETOROLAC TROMETHAMINE 30 MG: 30 INJECTION, SOLUTION INTRAMUSCULAR; INTRAVENOUS at 13:40:00

## 2024-07-08 RX ADMIN — LIDOCAINE HYDROCHLORIDE 100 MG: 10 INJECTION, SOLUTION EPIDURAL; INFILTRATION; INTRACAUDAL; PERINEURAL at 11:53:00

## 2024-07-08 RX ADMIN — KETAMINE HYDROCHLORIDE 25 MG: 50 INJECTION, SOLUTION INTRAMUSCULAR; INTRAVENOUS at 12:44:00

## 2024-07-08 RX ADMIN — ONDANSETRON 4 MG: 2 INJECTION INTRAMUSCULAR; INTRAVENOUS at 13:40:00

## 2024-07-08 RX ADMIN — MIDAZOLAM HYDROCHLORIDE 2 MG: 1 INJECTION INTRAMUSCULAR; INTRAVENOUS at 11:45:00

## 2024-07-08 RX ADMIN — HYDROMORPHONE HYDROCHLORIDE 0.5 MG: 1 INJECTION, SOLUTION INTRAMUSCULAR; INTRAVENOUS; SUBCUTANEOUS at 13:24:00

## 2024-07-08 RX ADMIN — SODIUM CHLORIDE, SODIUM LACTATE, POTASSIUM CHLORIDE, CALCIUM CHLORIDE: 600; 310; 30; 20 INJECTION, SOLUTION INTRAVENOUS at 13:40:00

## 2024-07-08 RX ADMIN — TRANEXAMIC ACID 1000 MG: 10 INJECTION, SOLUTION INTRAVENOUS at 12:03:00

## 2024-07-08 RX ADMIN — DEXAMETHASONE SODIUM PHOSPHATE 8 MG: 4 MG/ML VIAL (ML) INJECTION at 12:03:00

## 2024-07-08 RX ADMIN — HYDROMORPHONE HYDROCHLORIDE 0.5 MG: 1 INJECTION, SOLUTION INTRAMUSCULAR; INTRAVENOUS; SUBCUTANEOUS at 13:31:00

## 2024-07-08 NOTE — CONSULTS
General Medicine Consult      Reason for consult:    Consulted by: Devin Etienne MD    PCP: YVAN BARRAGAN      History of Present Illness: Patient is a 69 year old female with past medical history of asthma, hypertension, hyperlipidemia, GERD, anxiety/depression/bipolar is being seen for perioperative medical management after undergoing left total knee replacement by Dr. Etienne earlier today.  Tolerated the procedure well.  Offers no complaints.      PMH:  Past Medical History:    ADD (attention deficit disorder)    Anesthesia complication    Anxiety state    Arrhythmia    SVT    Arthritis    Asthma (HCC)    Back problem    Bipolar affective (HCC)    Cyst of right kidney    Depression    Esophageal reflux    High blood pressure    High cholesterol    History of stomach ulcers    Migraines    Muscle weakness    uses cane    Neuropathy    bilateral feet    PONV (postoperative nausea and vomiting)    Visual impairment    glasses        PSH:  Past Surgical History:   Procedure Laterality Date          x 3    Ep svt ablation      Hip replacement surgery Right     Knee surgery      left x 2 meniscal repair and right x 1 meniscal repair    Other Bilateral     salpingo oophorectomy    Other Right     wrist ganglion cyst removal    Removal of ovarian cyst(s)      Spine surgery procedure unlisted      vertiflex procedure        Home Medications:  Outpatient Medications Marked as Taking for the 24 encounter (Hospital Encounter)   Medication Sig Dispense Refill    fluticasone propionate 50 MCG/ACT Nasal Suspension 2 sprays by Each Nare route daily.      acetaminophen 500 MG Oral Tab Take 2 tablets (1,000 mg total) by mouth 4 (four) times daily.      aspirin 81 MG Oral Tab EC Take 1 tablet (81 mg total) by mouth 2 (two) times daily.      oxyCODONE 5 MG Oral Tab Take 1 tablet (5 mg total) by mouth every 6 (six) hours as needed.      traMADol 50 MG Oral Tab Take 1 tablet (50 mg total) by mouth every 6 (six) hours as  needed.      celecoxib 200 MG Oral Cap Take 1 capsule (200 mg total) by mouth daily.      verapamil 120 MG Oral Tab Take 1 tablet (120 mg total) by mouth at bedtime.      HYDROcodone-acetaminophen 5-325 MG Oral Tab Take 1 tablet by mouth 2 (two) times daily as needed for Pain.      docusate sodium 100 MG Oral Cap Take 1 capsule (100 mg total) by mouth 2 (two) times daily.      oxyCODONE 5 MG Oral Tab Take 1 tablet (5 mg total) by mouth every 6 (six) hours as needed for Pain (severe). Do not take same time with Tramadol or Norco      traMADol 50 MG Oral Tab Take 1 tablet (50 mg total) by mouth every 6 (six) hours as needed for Pain (moderate pain). Do not take same time as oxycodone or norco.      acetaminophen 500 MG Oral Tab Take 2 tablets (1,000 mg total) by mouth in the morning, at noon, in the evening, and at bedtime. Complete 2 week course      aspirin 81 MG Oral Tab EC Take 1 tablet (81 mg total) by mouth 2 (two) times daily. With food.  Complete 6 week course      celecoxib 200 MG Oral Cap Take 1 capsule (200 mg total) by mouth daily. 30 capsule 0    celecoxib 200 MG Oral Cap Take 1 capsule (200 mg total) by mouth daily. 30 capsule 0    lamoTRIgine 150 MG Oral Tab Take 2 tablets (300 mg total) by mouth at bedtime.      atorvastatin 10 MG Oral Tab Take 1 tablet (10 mg total) by mouth nightly.      Meloxicam 15 MG Oral Tab Take 1 tablet (15 mg total) by mouth daily.      Misc Natural Products (OSTEO BI-FLEX ADV JOINT SHIELD) Oral Tab Take 3,000 mg by mouth daily.      nicotine 21 MG/24HR Transdermal Patch 24 Hr Place 1 patch onto the skin daily.      omeprazole 20 MG Oral Capsule Delayed Release Take 1 capsule (20 mg total) by mouth every morning before breakfast.      QUEtiapine Fumarate  MG Oral Tablet 24 Hr Take 1 tablet (150 mg total) by mouth nightly.      docusate sodium 100 MG Oral Cap Take 100 mg by mouth 2 (two) times daily. 60 capsule 0    fluticasone-salmeterol 250-50 MCG/DOSE Inhalation  Aerosol Powder, Breath Activated Inhale 1 puff into the lungs daily.      Multiple Vitamins-Minerals (CENTRUM SILVER ULTRA WOMENS) Oral Tab Take 1 tablet by mouth daily.        Montelukast Sodium 10 MG Oral Tab Take 1 tablet (10 mg total) by mouth nightly.         Scheduled Medication:   aspirin  81 mg Oral BID    sennosides  17.2 mg Oral Nightly    docusate sodium  100 mg Oral BID    famotidine  20 mg Oral BID    Or    famotidine  20 mg Intravenous BID    ceFAZolin  2 g Intravenous Q8H    [START ON 7/9/2024] dexAMETHasone PF  8 mg Intravenous Once    ketorolac  15 mg Intravenous Q6H     Continuous Infusing Medication:   lactated ringers 20 mL/hr at 07/08/24 1145    lactated ringers 125 mL/hr at 07/08/24 1456     PRN Medication:  sodium chloride    polyethylene glycol (PEG 3350)    magnesium hydroxide    bisacodyl    fleet enema    ondansetron    metoclopramide    diphenhydrAMINE    diphenhydrAMINE **OR** diphenhydrAMINE    tiZANidine    oxyCODONE **OR** oxyCODONE    HYDROmorphone **OR** HYDROmorphone     ALL:  Allergies   Allergen Reactions    Penicillins HIVES        Soc Hx:  Social History     Tobacco Use    Smoking status: Some Days     Current packs/day: 1.00     Types: Cigarettes    Smokeless tobacco: Never   Substance Use Topics    Alcohol use: Yes     Comment: rare        Fam Hx  Family History   Problem Relation Age of Onset    Other (bladder cancer) Other        Review of Systems  Comprehensive ROS reviewed and negative except for what's stated above.  Including negative for chest pain, shortness of breath, syncope.       OBJECTIVE:  /67 (BP Location: Left arm)   Pulse 80   Temp 97.8 °F (36.6 °C) (Oral)   Resp 16   Ht 5' 5\" (1.651 m)   Wt 225 lb 15.5 oz (102.5 kg)   SpO2 94%   BMI 37.60 kg/m²   General:  Alert, no distress, appears stated age.   Head:  Normocephalic, without obvious abnormality, atraumatic.   Eyes:  Sclera anicteric, No conjunctival pallor, EOMs intact.    Nose: Nares normal.  Septum midline. Mucosa normal. No drainage.   Throat: Lips, mucosa, and tongue normal. Teeth and gums normal.   Neck: Supple, symmetrical, trachea midline, no cervical or supraclavicular lymph adenopathy, thyroid: no enlargment/tenderness/nodules appreciated   Lungs:   Clear to auscultation bilaterally. Normal effort   Chest wall:  No tenderness or deformity.   Heart:  Regular rate and rhythm, S1, S2 normal, no murmur, rub or gallop appreciated   Abdomen:   Soft, non-tender. Bowel sounds normal. No masses,  No organomegaly. Non distended   Extremities: Extremities normal, atraumatic, no cyanosis or edema.   Skin: Skin color, texture, turgor normal. No rashes or lesions.    Neurologic: Normal strength, no focal deficit appreciated       Diagnostics:   CBC/Chem  No results for input(s): \"WBC\", \"HGB\", \"MCV\", \"PLT\", \"BAND\", \"INR\" in the last 168 hours.    Invalid input(s): \"LYM#\", \"MONO#\", \"BASOS#\", \"EOSIN#\"    No results for input(s): \"NA\", \"K\", \"CL\", \"CO2\", \"BUN\", \"CREATSERUM\", \"GLU\", \"CA\", \"CAION\", \"MG\", \"PHOS\" in the last 168 hours.    No results for input(s): \"ALT\", \"AST\", \"ALB\", \"AMYLASE\", \"LIPASE\", \"LDH\" in the last 168 hours.    Invalid input(s): \"ALPHOS\", \"TBIL\", \"DBIL\", \"TPROT\"    Radiology:   All imaging personally reviewed      No results found.       ASSESSMENT / PLAN:    69 year old female with past medical history of asthma, hypertension, hyperlipidemia, GERD, anxiety/depression/bipolar is being seen for perioperative medical management after undergoing left total knee replacement    S/p L TKR, Jaimie 7/8   - PO/IV meds for pain control per surgery, wean to oral meds as able  - Adv diet as tolerated per surgery, zofran prn for nausea, OBR  - PT/OT/SW  - monitor for acute blood loss anemia, cbc in am  - asa BID and SCD for DVT prophy  - further management per surgery    Essential Hypertension  - BP parameters placed for BP meds to prevent hypotension  - Monitor on IV pain medications due to venodilatory  effect  - Hemodynamically stable  - Resume PTA antihypertensives    Asthma  - resume home inhalers    Hyperlipidemia  - statin    GERD  - pepcid    Anxiety  Depression  Bipolar   - stable     Outpatient records reviewed confirming patient's medical history and medications.     Further recommendations pending patient's clinical course.  [facility] hospitalist to continue to follow patient while in house    Patient and/or patient's family given opportunity to ask questions and note understanding and agreeing with therapeutic plan as outlined      Thank you for allowing me to participate in the care of this patient.     Thank You,      Jess Junior MD  [facility]  Message over [employer]  Pager: [phone]

## 2024-07-08 NOTE — ANESTHESIA PREPROCEDURE EVALUATION
PRE-OP EVALUATION    Patient Name: Cathryn Snow    Admit Diagnosis: LEFT KNEE OSTEOARTHRITIS    Pre-op Diagnosis: LEFT KNEE OSTEOARTHRITIS    LEFT KNEE TOTAL REPLACEMENT    Anesthesia Procedure: LEFT KNEE TOTAL REPLACEMENT (Left)    Surgeons and Role:     * Devin Etienne MD - Primary    Pre-op vitals reviewed.        Body mass index is 37.44 kg/m².    Current medications reviewed.  Hospital Medications:   acetaminophen (Tylenol Extra Strength) tab 1,000 mg  1,000 mg Oral Once    lactated ringers infusion   Intravenous Continuous    tranexamic acid in sodium chloride 0.7% (Cyklokapron) 1000 mg/100mL infusion premix 1,000 mg  1,000 mg Intravenous Once    ceFAZolin (Ancef) 2g in 10mL IV syringe premix  2 g Intravenous Once    clonidine/epinephrine/ropivacaine/ketorolac in 0.9% NaCl 60 mL pain cocktail syringe for knee arthroplasty   Infiltration Once (Intra-Op)       Outpatient Medications:     Medications Prior to Admission   Medication Sig Dispense Refill Last Dose    lamoTRIgine 150 MG Oral Tab Take 2 tablets (300 mg total) by mouth at bedtime.       atorvastatin 10 MG Oral Tab Take 1 tablet (10 mg total) by mouth nightly.       ZOLMITRIPTAN OR Take 2.5 mg by mouth as needed.       Meloxicam 10 MG Oral Cap Take 1 capsule by mouth daily.       Misc Natural Products (OSTEO BI-FLEX ADV JOINT SHIELD) Oral Tab Take 3,000 mg by mouth daily.       Hydrocortisone 2.5 % External Cream Place rectally 2 (two) times daily as needed for Hemorrhoids.       nicotine 14 MG/24HR Transdermal Patch 24 Hr Place 1 patch onto the skin daily.       omeprazole 20 MG Oral Capsule Delayed Release Take 1 capsule (20 mg total) by mouth every morning before breakfast.       QUEtiapine Fumarate  MG Oral Tablet 24 Hr Take 1 tablet (150 mg total) by mouth nightly.       Verapamil HCl  MG Oral Tab CR Take 1 tablet (120 mg total) by mouth nightly.       docusate sodium 100 MG Oral Cap Take 100 mg by mouth 2 (two) times daily. 60  capsule 0     fluticasone-salmeterol 250-50 MCG/DOSE Inhalation Aerosol Powder, Breath Activated Inhale 1 puff into the lungs daily.       Fluticasone Propionate (FLONASE NA) 2 sprays by Nasal route daily.         Multiple Vitamins-Minerals (CENTRUM SILVER ULTRA WOMENS) Oral Tab Take 1 tablet by mouth daily.         Montelukast Sodium 10 MG Oral Tab Take 1 tablet (10 mg total) by mouth nightly.       Albuterol Sulfate  (90 Base) MCG/ACT Inhalation Aero Soln Inhale into the lungs every 6 (six) hours as needed for Wheezing.       HYDROcodone-acetaminophen (NORCO) 5-325 MG Oral Tab Take 1 to 2 tablets po daily prn for severe pain 40 tablet 0        Allergies: Penicillins      Anesthesia Evaluation    Patient summary reviewed.    Anesthetic Complications  (+) history of anesthetic complications  History of: PONV       GI/Hepatic/Renal      (+) GERD and well controlled                          Cardiovascular        Exercise tolerance: good     MET: >4    (+) obesity  (+) hypertension and well controlled                                    Endo/Other      (-) diabetes                     (+) arthritis       Pulmonary  Comment: Current smoker    (+) asthma                     Neuro/Psych  Comment: Bipolar disorder  Negative neuro/psych ROS.  (+) depression           (+) neuromuscular disease (Spinal Cord Stimulator)    (+) psychiatric history         Patient Active Problem List:     Arthritis of right hip     Groin strain, right, subsequent encounter     Generalized arthritis     Vitamin D deficiency     Right foot pain     Difficulty walking     Porokeratosis     Azotemia     History of right hip replacement     Bipolar disorder (HCC)     S/P hip replacement, right     Moderate persistent asthma without complication (HCC)     Uncomplicated alcohol dependence (HCC)     Physical deconditioning     Generalized weakness     Mood disorder (HCC)     Affective psychosis, bipolar (HCC)     Lumbar radiculitis           Past  Surgical History:   Procedure Laterality Date          x 3    Ep svt ablation      Hip replacement surgery Right     Knee surgery      left x 2 meniscal repair and right x 1 meniscal repair    Other Bilateral     salpingo oophorectomy    Other Right     wrist ganglion cyst removal    Removal of ovarian cyst(s)      Spine surgery procedure unlisted      vertiflex procedure     Social History     Socioeconomic History    Marital status:    Tobacco Use    Smoking status: Some Days     Current packs/day: 1.00     Types: Cigarettes    Smokeless tobacco: Never   Vaping Use    Vaping status: Never Used   Substance and Sexual Activity    Alcohol use: Yes     Comment: rare    Drug use: Yes     Comment: edible-CBD with THC occasionally     History   Drug Use     Comment: edible-CBD with THC occasionally     Available pre-op labs reviewed.  Lab Results   Component Value Date    WBC 9.1 2024    RBC 4.58 2024    HGB 14.3 2024    HCT 41.5 2024    MCV 90.6 2024    MCH 31.2 2024    MCHC 34.5 2024    RDW 11.8 2024    .0 2024     Lab Results   Component Value Date     2024    K 4.1 2024     2024    CO2 26.0 2024    BUN 17 2024    CREATSERUM 1.04 (H) 2024     (H) 2024    CA 9.2 2024            Airway      Mallampati: II  Mouth opening: >3 FB  TM distance: 4 - 6 cm  Neck ROM: full Cardiovascular    Cardiovascular exam normal.  Rhythm: regular  Rate: normal     Dental  Comment: Poor dentition            Pulmonary    Pulmonary exam normal.                 Other findings              ASA: 3   Plan: general  NPO status verified and patient meets guidelines.    Post-procedure pain management plan discussed with surgeon and patient.  Surgeon requests: regional block  Comment: Due to presence of spinal cord stimulator, I did not encourage spinal anesthesia, patient ok to proceed with the GA. I  explained the intrinsics of general anesthesia to Cathryn Snow, common downsides like sleepiness, PONV, sore throat and hoarseness from airway manipulation, post-operative pain/discomfort, as well as those rare incidents like aspiration, dental /lip injury, corneal abrasion, pressure/nerve injuries from positioning, and exceedingly rare events like intraoperative awareness, allergic/febrile reactions, and life-threatening cardiopulmonary events. Per surgeon's request I explained option and benefits of  adductor canal block including significant- but unlikely complete pain relief following the surgical procedure and decreased need for postoperative opioid use. Realistic expectation for block duration was discussed as well. I also explained possible downsides of peripheral nerve blocks including failed block, prolonged nerve blockade leading to prolonged numbness in lower extremity and possible muscle weakness necessitating knee immobilization and falls precautions. Other very rare complications such as local anesthetic systemic toxicity (LAST), infection, hematoma formation, and permanent nerve damage was also discussed. All questions were answered and patient demonstrated understanding of realistic expectations and risks of peripheral nerve blocks, and wishes to proceed with the procedure. Sites of block were marked by me with patient confirmation. Informed consent was signed by patient.        Plan/risks discussed with: patient and Other (Friend and Grandchild)                Present on Admission:  **None**

## 2024-07-08 NOTE — ANESTHESIA POSTPROCEDURE EVALUATION
[facility]    Cathryn Snow Patient Status:  Outpatient in a Bed   Age/Gender 69 year old female MRN [id number]   Location [facility] POST ANESTHESIA CARE UNIT Attending Devin Etienne MD   Hosp Day # 0 PCP YVAN LAUREL       Anesthesia Post-op Note    LEFT KNEE TOTAL REPLACEMENT    Procedure Summary       Date: 07/08/24 Room / Location:  MAIN OR 14 / [facility] MAIN OR    Anesthesia Start: 1145 Anesthesia Stop: 1342    Procedure: LEFT KNEE TOTAL REPLACEMENT (Left: Knee) Diagnosis: (LEFT KNEE OSTEOARTHRITIS)    Surgeons: Devin Etienne MD Anesthesiologist: Loc Kiser MD    Anesthesia Type: general ASA Status: 3            Anesthesia Type: general    Vitals Value Taken Time   /81 07/08/24 1342   Temp 98.2 07/08/24 1342   Pulse 80 07/08/24 1341   Resp 16 07/08/24 1341   SpO2 100 % 07/08/24 1341   Vitals shown include unfiled device data.    Patient Location: PACU    Anesthesia Type: general    Airway Patency: patent and extubated    Postop Pain Control: inadequate, being treated    Mental Status: mildly sedated but able to meaningfully participate in the post-anesthesia evaluation    Nausea/Vomiting: none    Cardiopulmonary/Hydration status: stable euvolemic    Complications: no apparent anesthesia related complications    Postop vital signs: stable    Dental Exam: Unchanged from Preop    Patient to be discharged from PACU when criteria met.

## 2024-07-08 NOTE — PHYSICAL THERAPY NOTE
PHYSICAL THERAPY JOINT EVALUATION - INPATIENT     Room Number: 386/386-A  Evaluation Date: 7/8/2024  Type of Evaluation: Initial  Physician Order: PT Eval and Treat    Presenting Problem: s/p L TKA on 7/8/24  Co-Morbidities : ADD, anxiety, bipolar affective, depression, SVT, HTN, neuropathy, migraines, R BARBARA, spine surgery  Reason for Therapy: Mobility Dysfunction and Discharge Planning    PHYSICAL THERAPY ASSESSMENT   Patient is currently functioning below baseline with bed mobility, transfers, gait, and stair negotiation. Prior to admission, patient's baseline is mod ind with cane.   Patient is requiring minimal assist as a result of the following impairments: decreased functional strength, pain, impaired   balance, and limited   ROM.  Physical Therapy will continue to follow for duration of hospitalization.    Patient will benefit from continued skilled PT Services at discharge to promote functional independence and safety with additional support and return home with home health PT.    PLAN  PT Treatment Plan: Bed mobility;Endurance;Energy conservation;Patient education;Family education;Gait training;Range of motion;Strengthening;Stoop training;Transfer training;Stair training;Balance training  Rehab Potential : Good  Frequency (Obs): Daily  Number of Visits to Meet Established Goals: 2    CURRENT GOALS  Goal #1  Patient is able to demonstrate supine - sit EOB @ level: supervision     Goal #2  Patient is able to demonstrate transfers Sit to/from Stand at assistance level: supervision       Goal #3    Patient is able to ambulate 150 feet with assistive device at assistance level: supervision   Goal #4    Patient will negotiate 4 stairs/one curb w/ assistive device and supervision   Goal #5    Patient verbalizes and/or demonstrates all precautions and safety concerns independently    Goal #6      Goal Comments: Goals established on 7/8/2024      PHYSICAL THERAPY MEDICAL/SOCIAL HISTORY  History related to  current admission: Patient is a 69 year old female admitted on 7/8/2024 from home for L TKA.      HOME SITUATION  Type of Home: House   Home Layout: One level  Stairs to Enter : 2  Railing: Yes  Stairs to Bedroom: 0 (pt reports there's a small step into her kitchen)       Lives With: Roommate  Drives: Yes  Patient Owned Equipment: Rolling walker;Cane       Prior Level of Rich: Pt is typically independent with ADLs, ambulates with a cane, and drives. Pt reports she has several friends that will be helping her upon DC. Pt reports Dr. Etienne will be writing her a script for a bedside commode.     SUBJECTIVE  \"Can't I catch a breath, I just had surgery!\"       OBJECTIVE  Precautions: Bed/chair alarm  Fall Risk: High fall risk    WEIGHT BEARING RESTRICTION  Weight Bearing Restriction: L lower extremity           L Lower Extremity: Weight Bearing as Tolerated    PAIN ASSESSMENT  Rating: Unable to rate  Location: L knee  Management Techniques: Activity promotion;Relaxation;Repositioning    COGNITION  Overall Cognitive Status:  WFL - within functional limits    RANGE OF MOTION AND STRENGTH ASSESSMENT  Upper extremity ROM and strength are within functional limits     Lower extremity ROM is within functional limits, except LLE s/p TKA    Lower extremity strength is within functional limits, except LLE s/p TKA      BALANCE  Static Sitting: Good  Dynamic Sitting: Good  Static Standing: Poor +  Dynamic Standing: Poor +    ADDITIONAL TESTS                                    ACTIVITY TOLERANCE                         O2 WALK       NEUROLOGICAL FINDINGS                        AM-PAC '6-Clicks' INPATIENT SHORT FORM - BASIC MOBILITY  How much difficulty does the patient currently have...  Patient Difficulty: Turning over in bed (including adjusting bedclothes, sheets and blankets)?: A Little   Patient Difficulty: Sitting down on and standing up from a chair with arms (e.g., wheelchair, bedside commode, etc.): A Little   Patient  Difficulty: Moving from lying on back to sitting on the side of the bed?: A Little   How much help from another person does the patient currently need...   Help from Another: Moving to and from a bed to a chair (including a wheelchair)?: A Little   Help from Another: Need to walk in hospital room?: A Little   Help from Another: Climbing 3-5 steps with a railing?: A Little       AM-PAC Score:  Raw Score: 18   Approx Degree of Impairment: 46.58%   Standardized Score (AM-PAC Scale): 43.63   CMS Modifier (G-Code): CK    FUNCTIONAL ABILITY STATUS  Gait Assessment   Functional Mobility/Gait Assessment  Gait Assistance: Minimum assistance  Distance (ft): 2  Assistive Device: Rolling walker  Pattern:  (pt not putting much weight through LLE, increased weight through BUE on RW)    Skilled Therapy Provided: Per RN okay to work with pt. Pt received in supine and was initially not agreeable to PT evaluation but then requested to get to the chair to eat dinner.     Bed Mobility:  Rolling: NT  Supine to sit: supervision, HOB elevated    Sit to supine: NT     Transfer Mobility:  Sit to stand: CGA   Stand to sit: CGA  Gait = pt took several steps from bed to chair with RW and min A. Pt not putting much weight through LLE, increased weight through BUE on RW.     Therapist's Comments: Pt educated on role of therapy, goals for session, safety, fall prevention, WBAT, and activity recommendations.     Exercise/Education Provided:  Bed mobility  Energy conservation  Functional activity tolerated  Gait training  Strengthening  Transfer training    Patient End of Session: Up in chair;Call light within reach;Needs met;RN aware of session/findings;All patient questions and concerns addressed    Patient Evaluation Complexity Level:  History Moderate - 1 or 2 personal factors and/or co-morbidities   Examination of body systems Low - addressing 1-2 elements   Clinical Presentation Low - Stable   Clinical Decision Making Low Complexity       PT  Session Time: 30 minutes  Therapeutic Activity: 8 minutes

## 2024-07-08 NOTE — PLAN OF CARE
Patient alert and oriented x4. VSS on RA. Pain controlled with PO PRN pain meds. Denies n/t. Aquacel dressing to left knee, CDI. Spanda  in place, gel ice at bedside. SCDs in place, ankle pumps encouraged, IS encouraged. Pt rolling side to side. DTV by 2045. Tolerating diet, no c/o n/v.     Plan: PT/OT eval, pain control

## 2024-07-08 NOTE — OPERATIVE REPORT
ANATOMIC ALIGNMENT TOTAL KNEE OPERATIVE REPORT:  DATE OF SURGERY: 7/8/2024    Cathryn Snow       [id number]     6/20/1955    PREOP DX: LEFT  KNEE PRIMARY OSTEOARTHRITIS  POSTOP DX:LEFT KNEE PRIMARY OSTEOARTHRITIS  PROCEDURE: LEFT TOTAL KNEE REPLACEMENT (ANATOMIC ALIGNMENT)  SURGEON: CHARLES GREEN MD  FIRST ASSIST: BRITTANY RAYO  ANESTHESIA: SPINAL AND ADDUCTOR CANAL BLOCK  EBL: 50 ml  SPECIMEN: DISTAL FEMORAL AND PROXIMAL TIBIA CUT BONE  COMPLICATIONS: NONE  DRAIN: NONE  TT: 41 min.  IMPLANT:  DEPUY ATTUNE CR FEMORAL SIZE  5N ,  FB TIBIA SIZE 4, MS POLY SIZE 6,  38 ANATOMIC PATELLA  PROCEDURE NOTE:  PATIENT WAS CORRECTLY IDENTIFIED AND OPERATIVE SITE WAS VERIFIED IN THE PREOPERATIVE HOLDING AREA.  PATIENT WAS BROUGHT TO THE OR AND WAS PLACED IN SUPINE POSITION.  PREOPERATIVE ANTIBIOTIC AND TXA WERE GIVEN.  NONOPERATIVE LEG HAD SCD APPLIED AND WAS CUSHIONED.   ANESTHESIA WAS ADMINISTERED.  ARMS WERE POSITIONED BY ANESTHESIA.    OPERATIVE LEG WAS PREPPED AND DRAPED IN SURGICALLY STERILE AND STANDARD FASHION.   TIMEOUT WAS DONE.  BLOOD WAS EXSANGUINATED.  TOURNIQUET WAS INFLATED.  ANTERIOR LINEAR INCISION WAS MADE.  MEDIAL ARTHROTOMY WAS PERFORMED.  LIMITED MEDIAL RELEASE WAS DONE.  RETROPATELLAR FAT AND ANTERIOR FEMORAL FAT WERE REMOVED IN LIMITED FASHION.  PATELLA WAS SUBLUXED LATERALLY.  KNEE WAS FLEXED.  OSTEOARTHRITIS WAS IDENTIFIED.  OSTEOPHYTES WERE REMOVED.   ACL/PCL WERE REMOVED.  DISTAL FEMORAL INTRAMEDULLARY DRILL WAS DONE.  9 mm DISTAL FEMORAL CUT WAS MADE BY USING SURFACE MATCHING ANATOMIC TECHNIQUE.    PROXIMAL TIBIA WAS SUBLUXED ANTERIORLY.  MEDIAL AND LATERAL MENISCI WERE REMOVED.  PROXIMAL TIBIA CUT WAS MADE USING EXTRAMEDULLARY GUIDE TO MATCH THE DISTAL FEMORAL CUT IN PARALLEL FASHION.    EXTENSION GAP WAS CHECKED USING AN EXTENSION SPACER.    ATTENTION WAS TURNED BACK TO DISTAL FEMUR.  KNEE WAS FLEXED.  FEMUR WAS SIZED AT 5.   ANTERIOR-POSTERIOR CUT WAS MADE AT 0 DEG IN LINE WITH  POSTERIOR FEMORAL CONDYLES.  IT WAS A EQUAL CUT FROM BOTH POSTERIOR CONDYLES.   FLEXION AND EXTENSION GAPS WERE CHECKED USING A SPACER BLOCK.  GAPS WERE FOUND TO SATISFACTORY.    FEMUR WAS FINISHED OFF WITH CHAMFER AND NOTCH CUTS IN USUAL FASHION.  POSTERIOR FEMORAL OSTEOPHYTES WERE REMOVED.  POSTERIOR CAPSULAR RELEASE WAS DONE CAREFULLY.    PROXIMAL TIBIA WAS EXPOSED.  TIBIA WAS SIZED at 4 AND ROTATION WAS SET USING MEDIAL 1/3 OF TIBIA TUBERCLE.  TIBIA WAS PREPARED IN STANDARD FASHION.  TRIAL COMPONENTS WERE PLACED.  PATELLA WAS EVERTED AND THICKNESS MEASURED at 23 mm.  PATELLAR CUT WAS MADE FREE HANDED FASHION.   PATELLA WAS FINISHED.   TRIAL COMPONENTS WERE INSERTED.   KNEE WAS RANGED.  GOOD FULL EXTENSION WAS ESTABLISHED AND FLEXION BEYOND BEYOND 120 DEG  KNEE WAS OBTAINED.  KNEE WAS STABLE TO VALGUS AND VARUS STRESS.  PATELLA TRACKED NICELY.  ALL THE TRIAL IMPLANTS WERE REMOVED.  WOUND WAS IRRIGATED.     LOCAL COCKTAIL WAS INFILTRATED CAREFULLY TO POSTERIOR CAPSULE, PERIOSTEUM, BOTH GUTTERS, AND SUPERFICIAL SOFT TISSUE.  CEMENT WAS MIXED.  CEMENT WAS CAREFULLY APPLIED TO BOTH BONE SURFACE AND IMPLANT SURFACE.  FIRST TIBIA COMPONENT, THEN FEMORAL COMPONENT WERE APPLIED.  EXCESS CEMENT WAS REMOVED. REAL POLY WAS INSERTED. KNEE WAS EXTENDED.  PATELLAR COMPONENT WAS APPLIED.  KNEE WAS HELD IN EXTENSION UNTIL CEMENT WAS SET.  TOURNIQUET WAS DEFLATED.  HEMOSTASIS WAS OBTAINED.  IRRIGATION WAS DONE.    KNEE WAS RANGED AGAIN WITH GOOD STABILITY AND PATELLAR TRACKING.   ANY LOOSE OR EXCESS CEMENT WAS REMOVED.    ARTHROTOMY WAS CLOSED. SUBCUTANEOUS LAYER WAS CLOSED IN MULTIPLE LAYERS.  SKIN WAS CLOSED.  STERILE DRESSING WAS APPLIED.  ALL COUNTS WERE CORRECT.  FIRST ASSISTANT WAS NECESSARY FOR PATIENT POSITIONING, RETRACTION OF SOFT TISSUES, IMPLANT INSERTION, DISLOCATION AND REDUCTION OF THE KNEE JOINT, STABILITY TESTING, AND WOUND CLOSURE.  Devin Etienne MD  7/8/2024

## 2024-07-08 NOTE — ANESTHESIA PROCEDURE NOTES
Airway  Date/Time: 7/8/2024 11:55 AM    General Information and Staff    Anesthesiologist: Loc Kiser MD  Performed: anesthesiologist   Performed by: Loc Kiser MD  Authorized by: Loc Kiser MD

## 2024-07-08 NOTE — H&P
[facility]  History & Physical    Cathryn Snow Patient Status:  Outpatient in a Bed    1955 MRN [id number]   Location [facility] PERIOPERATIVE SERVICE Attending Devin Etienne MD   Hosp Day # 0 PCP YVAN BARRAGAN     Chief Complaint:  Left knee pain    History of Present Illness:  Cathryn Snow is a(n) 69 year old female. Patient is complaining of left knee pain.  Pain is moderate to severe.  This pain interferes with everyday functional activities and now even at rest.  Patient has failed reasonable conservative care.  Patient has failed time, life style changes, activity modifications,  exercise  program, analgesic medications, and injections.  Patient presents for left TKR.    History:  Past Medical History:    ADD (attention deficit disorder)    Anesthesia complication    Anxiety state    Arrhythmia    SVT    Arthritis    Asthma (HCC)    Back problem    Bipolar affective (HCC)    Cyst of right kidney    Depression    Esophageal reflux    High blood pressure    High cholesterol    History of stomach ulcers    Migraines    Muscle weakness    uses cane    Neuropathy    bilateral feet    PONV (postoperative nausea and vomiting)    Visual impairment    glasses     Past Surgical History:   Procedure Laterality Date          x 3    Ep svt ablation      Hip replacement surgery Right     Knee surgery      left x 2 meniscal repair and right x 1 meniscal repair    Other Bilateral     salpingo oophorectomy    Other Right     wrist ganglion cyst removal    Removal of ovarian cyst(s)      Spine surgery procedure unlisted      vertiflex procedure     Family History   Problem Relation Age of Onset    Other (bladder cancer) Other       reports that she has been smoking cigarettes. She has never used smokeless tobacco. She reports current alcohol use. She reports current drug use.    Allergies:  Allergies   Allergen Reactions    Penicillins HIVES       Home Medications:  Medications Prior to Admission    Medication Sig Dispense Refill Last Dose    fluticasone propionate 50 MCG/ACT Nasal Suspension 2 sprays by Each Nare route daily.   7/7/2024 at 1900    acetaminophen 500 MG Oral Tab Take 2 tablets (1,000 mg total) by mouth 4 (four) times daily.   post op    aspirin 81 MG Oral Tab EC Take 1 tablet (81 mg total) by mouth 2 (two) times daily.   post op    oxyCODONE 5 MG Oral Tab Take 1 tablet (5 mg total) by mouth every 6 (six) hours as needed.   post op    traMADol 50 MG Oral Tab Take 1 tablet (50 mg total) by mouth every 6 (six) hours as needed.   post op    celecoxib 200 MG Oral Cap Take 1 capsule (200 mg total) by mouth daily.   post op    verapamil 120 MG Oral Tab Take 1 tablet (120 mg total) by mouth at bedtime.   7/7/2024 at 1900    HYDROcodone-acetaminophen 5-325 MG Oral Tab Take 1 tablet by mouth 2 (two) times daily as needed for Pain.   7/7/2024 at 1700    lamoTRIgine 150 MG Oral Tab Take 2 tablets (300 mg total) by mouth at bedtime.   7/7/2024 at 1900    atorvastatin 10 MG Oral Tab Take 1 tablet (10 mg total) by mouth nightly.   7/7/2024 at 1900    Meloxicam 15 MG Oral Tab Take 1 tablet (15 mg total) by mouth daily.   6/24/2024    [facility] Natural Products (OSTEO BI-FLEX ADV JOINT SHIELD) Oral Tab Take 3,000 mg by mouth daily.   6/24/2024    nicotine 21 MG/24HR Transdermal Patch 24 Hr Place 1 patch onto the skin daily.   7/7/2024    omeprazole 20 MG Oral Capsule Delayed Release Take 1 capsule (20 mg total) by mouth every morning before breakfast.   7/7/2024 at 1900    QUEtiapine Fumarate  MG Oral Tablet 24 Hr Take 1 tablet (150 mg total) by mouth nightly.   7/7/2024 at 1900    docusate sodium 100 MG Oral Cap Take 100 mg by mouth 2 (two) times daily. 60 capsule 0 7/6/2024    fluticasone-salmeterol 250-50 MCG/DOSE Inhalation Aerosol Powder, Breath Activated Inhale 1 puff into the lungs daily.   7/8/2024 at 0700    Multiple Vitamins-Minerals (CENTRUM SILVER ULTRA WOMENS) Oral Tab Take 1 tablet by mouth  daily.     6/24/2024    Montelukast Sodium 10 MG Oral Tab Take 1 tablet (10 mg total) by mouth nightly.   7/7/2024 at 1900    Zolmitriptan 2.5 MG Oral Tab Take 1 tablet (2.5 mg total) by mouth See Admin Instructions. TAKE 1 TABLET BY MOUTH AS NEEDED FOR MIGRAINE. MAY REPEAT AFTER 2 HOURS. NOT TO EXCEED 2 TABLETS IN 24 HOURS   More than a month    Albuterol Sulfate  (90 Base) MCG/ACT Inhalation Aero Soln Inhale into the lungs every 6 (six) hours as needed for Wheezing.   More than a month       Review of Systems:  A comprehensive review of systems was negative.    Physical Exam:  General: Alert, orientated x3.  Cooperative.  No apparent distress.  Vital Signs:  /70 (BP Location: Right arm)   Pulse 71   Temp 98.1 °F (36.7 °C) (Temporal)   Resp 18   Ht 5' 5\" (1.651 m)   Wt 225 lb 15.5 oz (102.5 kg)   SpO2 100%   BMI 37.60 kg/m²   Lungs: non-labored respirations  Cardiac: Regular rate  Extremities:  left  knee skin is intact.  Both calves are NT.  Motor/sensory exam are intact.  DP palpable.    Review of left knee XR shows advanced OA.    Laboratory Data:  CBC and CMP checked and looks good.      Impression and Plan:  LEFT KNEE PRIMARY OSTEOARTHRITIS:    Patient’s diagnosis and treatment options were discussed.  Conservative care and  surgical intervention including joint replacement options were discussed.  Extensive need for physical therapy after surgery emphasized.  Hospital course, recovery process, expectations, limitations after surgery explained.  Pros and cons of these options were explained. Prognosis discussed.  Risks and complications including but not limited to infection, DVT/PE/DEATH, nerve and blood vessel injuries, arthrofibrosis, fractures, dislocations, ligament and tendon ruptures, chronic pain, blood transfusions, leg length difference, possible revision surgeries, anesthesia and other medical complications explained.   All questions were encouraged and answered.  Patient  consents to TKR.        Devin Etienne MD  7/8/2024  10:35 AM

## 2024-07-08 NOTE — DISCHARGE INSTRUCTIONS
Medications:  Aspirin 81 mg  take one tab twice daily to prevent blood clot.  Please complete total of 6 weeks course.  Colace 100 mg is a stool softener.  Please take twice daily for constipation. Discontinue when narcotics (tramadol/oxycodone/norco, etc) are no longer required for pain control.  Extra Strength Tylenol 500mg.  Take 2 tabs (1000mg) 4 times daily.  (maximum 4000mg daily) for 2 weeks.  After 2 weeks, take one tab as needed every 6 hours.  Celebrex 200mg  is a nonsteroidal anti-inflammatory medication. Please take one tab daily for 30 days.  Tramadol 50mg is a pain medication for moderate pain.  Take every 6 hours as needed.  Oxycodone 5mg is a pain medication for severe pain. Take every 6 hours as needed.  Do not take Tramadol and Oxydone at the same time.  Space out at least one hour.     Please stop taking norco at this time.  Stopping norco will help post op pain control.    Please do not take norco while on Tramadol and Oxycodone.  Theses are all potent narcotic media cations with addictive potential.  Take tramadol and oxycodone as instructed.    Sometimes managing your health at home requires assistance.   The [facility] team has recognized your preference to use [facility].  They can be reached by phone at [phone].  The fax number for your reference is [phone]  A representative from the home health agency will contact you or your family to schedule your first visit.    It was recommended that you use a commode at home to facilitate your recovery and compliment your treatment.  The [facility] team has set up delivery with [facility] [phone].  Please  DME from their offices.    If you experienced any difficulties with the , please contact the [place] Case Management department at [phone].

## 2024-07-09 VITALS
WEIGHT: 226 LBS | OXYGEN SATURATION: 98 % | DIASTOLIC BLOOD PRESSURE: 63 MMHG | HEIGHT: 65 IN | TEMPERATURE: 99 F | RESPIRATION RATE: 16 BRPM | HEART RATE: 77 BPM | BODY MASS INDEX: 37.65 KG/M2 | SYSTOLIC BLOOD PRESSURE: 134 MMHG

## 2024-07-09 PROCEDURE — 97535 SELF CARE MNGMENT TRAINING: CPT

## 2024-07-09 PROCEDURE — 97165 OT EVAL LOW COMPLEX 30 MIN: CPT

## 2024-07-09 PROCEDURE — 97530 THERAPEUTIC ACTIVITIES: CPT

## 2024-07-09 PROCEDURE — 97116 GAIT TRAINING THERAPY: CPT

## 2024-07-09 RX ORDER — SUMATRIPTAN 25 MG/1
25 TABLET, FILM COATED ORAL EVERY 2 HOUR PRN
Status: DISCONTINUED | OUTPATIENT
Start: 2024-07-09 | End: 2024-07-09

## 2024-07-09 NOTE — OCCUPATIONAL THERAPY NOTE
OCCUPATIONAL THERAPY EVALUATION - INPATIENT    Room Number: 386/386-A  Evaluation Date: 7/9/2024     Type of Evaluation: Initial  Presenting Problem: s/p L TKA 7/8/24    Physician Order: IP Consult to Occupational Therapy  Reason for Therapy:  ADL/IADL Dysfunction and Discharge Planning      OCCUPATIONAL THERAPY ASSESSMENT   Patient is a 69 year old female admitted on 7/8/2024 with Presenting Problem: s/p L TKA 7/8/24. Co-Morbidities : ADD, anxiety, bipolar affective, depression, SVT, HTN, neuropathy, migraines, R BARBARA, spine surgery  Patient is currently functioning near baseline with toileting, lower body dressing, transfers, and dynamic standing balance.  Prior to admission, patient's baseline is independent (except shoe management).  Patient met all OT goals at supervision to Mod I level (except sock/shoe management, intends for family to help).  Patient reports no further questions/concerns at this time.     No further skilled OT needs at this time.      WEIGHT BEARING RESTRICTION  Weight Bearing Restriction: L lower extremity           L Lower Extremity: Weight Bearing as Tolerated    Recommendations for nursing staff:   Transfers: 1-person  Toileting location: Toilet    EVALUATION SESSION:  Patient at start of session: supine  FUNCTIONAL TRANSFER ASSESSMENT  Sit to Stand: Edge of Bed; Chair  Edge of Bed: Supervision  Chair: Supervision  Toilet Transfer: Supervision (commode over toilet, reports same setup at home)    BED MOBILITY  Supine to Sit : Modified Independent  Sit to Supine (OT): Modified Independent    BALANCE ASSESSMENT     FUNCTIONAL ADL ASSESSMENT  Eating: Modified Independent  Grooming Standing: Modified Independent  UB Dressing Seated: Modified Independent  LB Dressing Seated: Supervision (supervision donning underwear and pants to knees; declined attempting to don socks/shoes, total assist required, reports has sock aide at home but prefers for family to assist)  LB Dressing Standing:  Supervision  Toileting Seated: Supervision  Toileting Standing: Supervision      ACTIVITY TOLERANCE: WFL                         O2 SATURATIONS       COGNITION  Overall Cognitive Status:  WFL - within functional limits  COGNITION ASSESSMENTS       Upper Extremity:   ROM: within functional limits   Strength: is within functional limits     EDUCATION PROVIDED  Patient: Role of Occupational Therapy; Adaptive Equipment Recommendations; Functional Transfer Techniques; Fall Prevention; Weight Bear Status; Compensatory ADL Techniques  Patient's Response to Education: Verbalized Understanding    Equipment used: RW  Demonstrates functional use    Therapist comments: Patient motivated and participatory. Educated on safety precautions and incorporation into ADLs and transfers, followed with good return demo. Performed all ADLs and functional transfers SBA to Mod I, aware of recommendation for initial supervision during shower transfers/showering. Patient reports will have initial supervision from friend/roommate and/or grandson at discharge.  Patient reports no further questions or concerns regarding discharge home to [facility].     Patient End of Session: Up in chair;With  staff;Needs met;Call light within reach;RN aware of session/findings;All patient questions and concerns addressed;Ice applied;Alarm set    OCCUPATIONAL PROFILE    HOME SITUATION  Type of Home: House  Home Layout: One level  Lives With: Roommate;Other (Comment) (and 19yo grandson)    Toilet and Equipment: Standard height toilet;3-in-1 commode  Shower/Tub and Equipment: Tub-shower combo;Shower chair (reports planning to get transfer bench)  Other Equipment: Reacher;Sock aid    Occupation/Status: retired RN     Drives: Yes  Patient Regularly Uses: Glasses    Prior Level of Function: Patient reports independent in most BADLs and functional mobility via cane prior to admission. Only receives assist for shoe management, and occasional sock management.  Friend/roommate or grandson help with all IADLs.    SUBJECTIVE  \"Look at me, I'm walking!\"    PAIN ASSESSMENT  Rating: 3  Location: L knee/thigh  Management Techniques: Activity promotion;Body mechanics;Relaxation;Ice    OBJECTIVE  Precautions: Bed/chair alarm  Fall Risk: High fall risk    WEIGHT BEARING RESTRICTION  Weight Bearing Restriction: L lower extremity           L Lower Extremity: Weight Bearing as Tolerated    AM-PAC ‘6-Clicks’ Inpatient Daily Activity Short Form  -   Putting on and taking off regular lower body clothing?: A Little  -   Bathing (including washing, rinsing, drying)?: A Little (supervision)  -   Toileting, which includes using toilet, bedpan or urinal? : A Little (supervision)  -   Putting on and taking off regular upper body clothing?: None  -   Taking care of personal grooming such as brushing teeth?: None  -   Eating meals?: None    AM-PAC Score:  Score: 21  Approx Degree of Impairment: 32.79%  Standardized Score (AM-PAC Scale): 44.27      ADDITIONAL TESTS     NEUROLOGICAL FINDINGS        PLAN   Patient has been evaluated and presents with no skilled Occupational Therapy needs at this time.  Patient discharged from Occupational Therapy services.  Please re-order if a new functional limitation presents during this admission.      Patient Evaluation Complexity Level:   Occupational Profile/Medical History LOW - Brief history including review of medical or therapy records    Specific performance deficits impacting engagement in ADL/IADL LOW  1 - 3 performance deficits    Client Assessment/Performance Deficits LOW - No comorbidities nor modifications of tasks    Clinical Decision Making LOW - Analysis of occupational profile, problem-focused assessments, limited treatment options    Overall Complexity LOW     OT Session Time: 30 minutes  Self-Care Home Management: 10 minutes

## 2024-07-09 NOTE — CM/SW NOTE
PT recs for HH. [facility] chosen in ortho office.    HOME SITUATION  Type of Home: House   Home Layout: One level  Stairs to Enter : 2  Railing: Yes  Stairs to Bedroom: 0 (pt reports there's a small step into her kitchen)     Lives With: Roommate  Drives: Yes  Patient Owned Equipment: Rolling walker;Cane     Prior Level of [place]: Pt is typically independent with ADLs, ambulates with a cane, and drives. Pt reports she has several friends that will be helping her upon DC. Pt reports Dr. Etienne will be writing her a script for a bedside commode.     Ingrid Dunbar, MSN RN, CM

## 2024-07-09 NOTE — PLAN OF CARE
Patient A&Ox4. Patient resting in bed, no apparent distress. Patient resting on room air. Patient  on tele/, vital sign monitoring. Patient with reported pain, PRN pain medication given, see MAR for more details. IS. Nicotine patch. Iv abx. WBAT. Ice therapy. Immobilizer. SCDs. Neurochecks. Scheduled toradol.   Safety/fall precautions in place. Call light in reach.       Problem: Patient/Family Goals  Goal: Patient/Family Long Term Goal  Description: Patient's Long Term Goal: Return to baseline walking     Interventions:  - Follow MD recommendations   - Work with PT/OT  - See additional Care Plan goals for specific interventions  Outcome: Progressing  Goal: Patient/Family Short Term Goal  Description: Patient's Short Term Goal: Pain control   7/8 noc: rest, sleep    Interventions:   - Follow MD recommendations   - Pharmacologic interventions   - Nonpharmacologic interventions   - See additional Care Plan goals for specific interventions  Outcome: Progressing     Problem: PAIN - ADULT  Goal: Verbalizes/displays adequate comfort level or patient's stated pain goal  Description: INTERVENTIONS:  - Encourage pt to monitor pain and request assistance  - Assess pain using appropriate pain scale  - Administer analgesics based on type and severity of pain and evaluate response  - Implement non-pharmacological measures as appropriate and evaluate response  - Consider cultural and social influences on pain and pain management  - Manage/alleviate anxiety  - Utilize distraction and/or relaxation techniques  - Monitor for opioid side effects  - Notify MD/LIP if interventions unsuccessful or patient reports new pain  - Anticipate increased pain with activity and pre-medicate as appropriate  Outcome: Progressing     Problem: MUSCULOSKELETAL - ADULT  Goal: Return mobility to safest level of function  Description: INTERVENTIONS:  - Assess patient stability and activity tolerance for standing, transferring and ambulating w/ or w/o  assistive devices  - Assist with transfers and ambulation using safe patient handling equipment as needed  - Ensure adequate protection for wounds/incisions during mobilization  - Obtain PT/OT consults as needed  - Advance activity as appropriate  - Communicate ordered activity level and limitations with patient/family  Outcome: Progressing  Goal: Maintain proper alignment of affected body part  Description: INTERVENTIONS:  - Support and protect limb and body alignment per provider's orders  - Instruct and reinforce with patient and family use of appropriate assistive device and precautions (e.g. spinal or hip dislocation precautions)  Outcome: Progressing

## 2024-07-09 NOTE — PROGRESS NOTES
[facility]   part of [facility] Hospitalist Progress Note     Cathryn Snow Patient Status:  Outpatient in a Bed    1955 MRN [id number]   Location [facility] 3SW-A Attending Devin Etienne MD   Hosp Day # 0 PCP YVAN BARRAGAN     Subjective:     Patient seen and examined.   No issues overnight  Says she developed a migraine last night  Afebrile  Some pain in knee  No other issues overnight    Objective:    Review of Systems:   10 point ROS completed and was negative, except for pertinent positive and negatives stated in subjective.    Vital signs:  Temp:  [97.8 °F (36.6 °C)-98.3 °F (36.8 °C)] 98 °F (36.7 °C)  Pulse:  [71-87] 87  Resp:  [14-27] 20  BP: (106-144)/(58-91) 119/66  SpO2:  [94 %-100 %] 97 %    Physical Exam:    General: No acute distress.   HEENT:  EOMI, PERRLA, OP clear  Respiratory: Clear to auscultation bilaterally. No wheezes. No rhonchi.  Cardiovascular: S1, S2. Regular rate and rhythm. No murmurs.  Abdomen: Soft, nontender, nondistended.  Positive bowel sounds. No rebound or guarding.  Extremities: No edema.  Neuro:  Grossly non focal, no motor deficits.        Diagnostic Data:    Labs:  No results for input(s): \"WBC\", \"HGB\", \"MCV\", \"PLT\", \"BAND\", \"INR\" in the last 168 hours.    Invalid input(s): \"LYM#\", \"MONO#\", \"BASOS#\", \"EOSIN#\"    No results for input(s): \"GLU\", \"BUN\", \"CREATSERUM\", \"GFRAA\", \"GFRNAA\", \"CA\", \"ALB\", \"NA\", \"K\", \"CL\", \"CO2\", \"ALKPHO\", \"AST\", \"ALT\", \"BILT\", \"TP\" in the last 168 hours.    CrCl cannot be calculated (Patient's most recent lab result is older than the maximum 7 days allowed.).    No results for input(s): \"PTP\", \"INR\" in the last 168 hours.         COVID-19 Lab Results    COVID-19  Lab Results   Component Value Date    COVID19 NOT DETECTED 2021    COVID19 NOT DETECTED 2021    COVID19 NOT DETECTED 2021       Pro-Calcitonin  No results for input(s): \"PCT\" in the last 168 hours.    Cardiac  No results for  input(s): \"TROP\", \"PBNP\" in the last 168 hours.    Creatinine Kinase  No results for input(s): \"CK\" in the last 168 hours.    Inflammatory Markers  No results for input(s): \"CRP\", \"JACKIE\", \"LDH\", \"DDIMER\" in the last 168 hours.    Imaging: Imaging data reviewed in Epic.    Medications:    aspirin  81 mg Oral BID    sennosides  17.2 mg Oral Nightly    docusate sodium  100 mg Oral BID    famotidine  20 mg Oral BID    Or    famotidine  20 mg Intravenous BID    ketorolac  15 mg Intravenous Q6H    atorvastatin  10 mg Oral Nightly    fluticasone furoate-vilanterol  1 puff Inhalation Daily    lamoTRIgine  300 mg Oral Nightly    montelukast  10 mg Oral Nightly    nicotine  1 patch Transdermal Q24H       Assessment & Plan:    69 year old female with past medical history of asthma, hypertension, hyperlipidemia, GERD, anxiety/depression/bipolar is being seen for perioperative medical management after undergoing left total knee replacement     End stage left eye s/p L TKR 7/8 /2024  Post op pain  Main management per orthopedic service, including pain control, wound care, DVT prophylaxis, and disposition  Encourage early ambulation  Encourage I-S use  PT/OT  asa BID and SCD for DVT prophy     HTN  - BP parameters placed for BP meds to prevent hypotension  - Monitor on IV pain medications due to venodilatory effect  - Hemodynamically stable  - Resume PTA antihypertensives     Asthma  - resume home inhalers     Hyperlipidemia  - statin     GERD  - pepcid     Anxiety  Depression  Bipolar   - stable      DISPO:  Main dc plan per ortho  From medical standpoint ok for dc  Medical portion of med rec done        Thank you for allowing me to participate in the care of this patient.  I will be following the patient while she is in the hospital.        Yamilka Christy MD  [facility] Hospitalist  Pager [phone]  Answering Service number: [phone]

## 2024-07-09 NOTE — CM/SW NOTE
Informed by RN that pt is requesting bedside commode for home.    Patient requires a bedside commode for home use.  Patient is physically incapable of utilizing regular toilet facilities as he/she is confined one level of the home environment and there is no toilet on that level in the home.    Order placed.  Contacted ortho PA to request cosign and verbiage as above in her progress note.  Once received, will send referral to E.  / to remain available for support and/or discharge planning.     Tana Rosales, [facility]  Discharge Planner  [phone]    Addendum:  Met with pt at bedside to discuss DC planning as above.  Pt stated that she prefers to use [facility] as it is near her home and her family can  the commode today.  She has been in contact with them prior to admission.  Spoke with Stephen from [facility] ([phone], fax: [phone]) who confirmed commode can be provided once order and verbiage received.  Await completion of PA note/verbiage in order to fax to them.      Addendum:  Order/verbiage faxed to [facility].  Spoke with Anson at [facility] who confirmed they were received.  Pt approved for commode, which can be picked up at any time before 6pm today.  Met with pt at bedside to update her.  Pt's family will  DME and also provide transport for pt's discharge today.  No further needs at this time.  Updated RN.

## 2024-07-09 NOTE — PROGRESS NOTES
[facility]   part of [facility] Rosita Snow Patient Status:  Outpatient in a Bed    1955 MRN [id number]   Location [facility] 3SW-A Attending Devin Etienne MD   Hosp Day # 0 PCP YVAN BARRAGAN       Subjective:    POD #1 s/p left total knee arthroplasty  No major complaints.  No calf pain, CP, SOB, lightheadedness, nausea.      Physical Exam:  Temp:  [97.6 °F (36.4 °C)-98.3 °F (36.8 °C)] 97.6 °F (36.4 °C)  Pulse:  [77-87] 87  Resp:  [14-27] 16  BP: (106-144)/(58-91) 125/58  SpO2:  [94 %-100 %] 99 %    In no acute distress  Knee Aquacel dressing is clean and dry.  No signs of infection  Thigh and leg are soft.  Both calves are NT.  No signs of DVT.  NVI +PF/DF +PP    Data review:  Post op x-rays reviewed    S/p Left TKR      Today's plan discussed.  Significance of achieving good ROM in a timely fashion explained.  PT/OT  DVT prophylaxis with  aspirin 81mg BID x 6 weeks  Anticipate DC to home today with [facility]  She requires a bedside commode for home use. She is physically incapable of utilizing regular toilet facilities as she is confined to one level of the home and there is no toilet on that level in her home.  Follow up in clinic in 2 weeks       Anni Becerra PA-C

## 2024-07-09 NOTE — PHYSICAL THERAPY NOTE
PHYSICAL THERAPY TREATMENT NOTE - INPATIENT    Room Number: 386/386-A     Session: 1     Number of Visits to Meet Established Goals: 2    Presenting Problem: s/p L TKA on 7/8/24  Co-Morbidities : ADD, anxiety, bipolar affective, depression, SVT, HTN, neuropathy, migraines, R BARBARA, spine surgery    ASSESSMENT   Patient demonstrates fair progress this session, goals  remain in progress.    Patient continues to function below baseline with bed mobility, transfers, gait, and stair negotiation.  Contributing factors to remaining limitations include pain, impaired standing  balance, and decreased muscular endurance.  Next session anticipate patient to progress gait.  Physical Therapy will continue to follow patient for duration of hospitalization.    Patient continues to benefit from continued skilled PT services: at discharge to promote functional independence in home.  Anticipate patient will return home with home health PT.    PLAN  PT Treatment Plan: Bed mobility;Endurance;Energy conservation;Patient education;Family education;Gait training;Range of motion;Strengthening;Stoop training;Transfer training;Stair training;Balance training  Rehab Potential : Good  Frequency (Obs): Daily    CURRENT GOALS       Goal #1  Patient is able to demonstrate supine - sit EOB @ level: supervision      Goal #2  Patient is able to demonstrate transfers Sit to/from Stand at assistance level: supervision         Goal #3     Patient is able to ambulate 150 feet with assistive device at assistance level: supervision   Goal #4     Patient will negotiate 4 stairs/one curb w/ assistive device and supervision   Goal #5     Patient verbalizes and/or demonstrates all precautions and safety concerns independently    Goal #6        Goal Comments: Goals established on 7/8/2024 7/9/2024 all goals progressing     SUBJECTIVE  \"I want to lay down because of my head, but I know it's better for me to sit in the chair\"     OBJECTIVE  Precautions:  Bed/chair alarm    WEIGHT BEARING RESTRICTION  Weight Bearing Restriction: L lower extremity           L Lower Extremity: Weight Bearing as Tolerated    PAIN ASSESSMENT   Rating: Unable to rate  Location: HEad and L quad  Management Techniques: Activity promotion;Body mechanics;Breathing techniques;Relaxation;Repositioning    BALANCE                                                                                                                       Static Sitting: Good  Dynamic Sitting: Fair +           Static Standing: Fair -  Dynamic Standing: Poor +    ACTIVITY TOLERANCE                         O2 WALK         AM-PAC '6-Clicks' INPATIENT SHORT FORM - BASIC MOBILITY  How much difficulty does the patient currently have...  Patient Difficulty: Turning over in bed (including adjusting bedclothes, sheets and blankets)?: None   Patient Difficulty: Sitting down on and standing up from a chair with arms (e.g., wheelchair, bedside commode, etc.): None   Patient Difficulty: Moving from lying on back to sitting on the side of the bed?: None   How much help from another person does the patient currently need...   Help from Another: Moving to and from a bed to a chair (including a wheelchair)?: A Little   Help from Another: Need to walk in hospital room?: A Little   Help from Another: Climbing 3-5 steps with a railing?: A Little       AM-PAC Score:  Raw Score: 21   Approx Degree of Impairment: 28.97%   Standardized Score (AM-PAC Scale): 50.25   CMS Modifier (G-Code): CJ    FUNCTIONAL ABILITY STATUS  Gait Assessment   Functional Mobility/Gait Assessment  Gait Assistance: Contact guard assist  Distance (ft): 80,125  Assistive Device: Rolling walker  Pattern: L Decreased stance time (step -to)  Stairs: Stairs;Car transfer  How Many Stairs: 4  Device: 2 Rails  Assist: Contact guard assist  Pattern: Ascend and Descend  Ascend and Descend : Step to  Car transfer: supervision    Skilled Therapy Provided  Pt presents seated in BS  chair  Therapist educated pt on activity recs, ROM L knee, seated and standing HEP per TKA protocol, and safe mobility.   Therapist gait trained pt c RW, max cues for step length and increased L knee flexion during swing phase c poor carryover  Therapist cued pt for sequencing for t/f training as noted above.   Seated rest break required   Pt with HA pain , denies dizziness, received pain meds prior to session.   Pt has RW for use at d/c, therapist educated pt on proper sizing, pt verbalizes understanding   Bed Mobility:  Rolling:    Supine<>Sit:    Sit<>Supine:      Transfer Mobility:  Sit<>Stand: supervision    Stand<>Sit: supervision    Gait: CGA c RW     Therapist's Comments:       THERAPEUTIC EXERCISES  Lower Extremity Ankle pumps  Heel raises  Heel slides  Standing knee flexion      Upper Extremity      Position Sitting & Standing     Repetitions   8   Sets   1     Patient End of Session: Up in chair;Needs met;Call light within reach;RN aware of session/findings;All patient questions and concerns addressed;Alarm set;Ice applied    PT Session Time: 25 minutes  Gait Training: 15 minutes  Therapeutic Activity: 10 minutes  Therapeutic Exercise:  minutes   Neuromuscular Re-education:  minutes

## 2024-07-09 NOTE — PLAN OF CARE
Post-op day 1. Dressing is clean, dry, and intact with compression sleeve and gel ice in place. Alert and oriented x4. Room air. Regular diet. Cane and Glasses at bedside. Last bowel movement 7/7. Weightbearing as tolerated with walker. Physical and occupational therapy evaluations completed. Post-op video viewed before discharge. Confirmed nicotine patch in place. Plan is home with [facility].

## 2024-07-20 ENCOUNTER — HOSPITAL ENCOUNTER (EMERGENCY)
Age: 69
Discharge: HOME | End: 2024-07-20
Attending: EMERGENCY MEDICINE
Payer: MEDICARE

## 2024-07-20 VITALS
HEART RATE: 81 BPM | SYSTOLIC BLOOD PRESSURE: 123 MMHG | RESPIRATION RATE: 16 BRPM | OXYGEN SATURATION: 99 % | TEMPERATURE: 97 F | WEIGHT: 225 LBS | HEIGHT: 65 IN | DIASTOLIC BLOOD PRESSURE: 67 MMHG | BODY MASS INDEX: 37.49 KG/M2

## 2024-07-20 DIAGNOSIS — R39.9 UTI SYMPTOMS: Primary | ICD-10-CM

## 2024-07-20 LAB
BILIRUB UR QL CFM: NEGATIVE
CLARITY UR REFRACT.AUTO: CLEAR
COLOR UR AUTO: YELLOW
GLUCOSE UR STRIP.AUTO-MCNC: NEGATIVE MG/DL
KETONES UR STRIP.AUTO-MCNC: 15 MG/DL
LEUKOCYTE ESTERASE UR QL STRIP.AUTO: NEGATIVE
NITRITE UR QL STRIP.AUTO: NEGATIVE
PH UR STRIP.AUTO: 5.5 [PH] (ref 5–8)
PROT UR STRIP.AUTO-MCNC: NEGATIVE MG/DL
RBC UR QL AUTO: NEGATIVE
SP GR UR STRIP.AUTO: >=1.03 (ref 1–1.03)
UROBILINOGEN UR STRIP.AUTO-MCNC: 1 MG/DL

## 2024-07-20 PROCEDURE — 99284 EMERGENCY DEPT VISIT MOD MDM: CPT

## 2024-07-20 PROCEDURE — 81003 URINALYSIS AUTO W/O SCOPE: CPT | Performed by: EMERGENCY MEDICINE

## 2024-07-20 PROCEDURE — 99283 EMERGENCY DEPT VISIT LOW MDM: CPT

## 2024-07-20 PROCEDURE — 87086 URINE CULTURE/COLONY COUNT: CPT | Performed by: PHYSICIAN ASSISTANT

## 2024-07-20 NOTE — DISCHARGE INSTRUCTIONS
You will be notified of your urine culture results if antibiotics are needed.  Continue follow-up with your primary care provider as planned.    Return to the emergency department for any new or worsening symptoms.

## 2024-07-20 NOTE — ED PROVIDER NOTES
Patient Seen in: [facility] In [place]      History     Chief Complaint   Patient presents with    Urinary Symptoms     Stated Complaint: UTI sxs x 4 days    Subjective:   HPI    68 YO female presents to emergency department for evaluation of mild dysuria, urinary urgency, dark-colored urine and sensation of bladder pressure starting 4 days ago. Urinary symptoms are intermittent. She denies fever/chills or any other systemic symptoms.          Objective:   Past Medical History:    ADD (attention deficit disorder)    Anesthesia complication    Anxiety state    Arrhythmia    SVT    Arthritis    Asthma (HCC)    Back problem    Bipolar affective (HCC)    Cyst of right kidney    Depression    Esophageal reflux    High blood pressure    High cholesterol    History of stomach ulcers    Migraines    Muscle weakness    uses cane    Neuropathy    bilateral feet    PONV (postoperative nausea and vomiting)    Visual impairment    glasses              Past Surgical History:   Procedure Laterality Date          x 3    Ep svt ablation      Hip replacement surgery Right     Knee replacement surgery      Knee surgery      left x 2 meniscal repair and right x 1 meniscal repair    Other Bilateral     salpingo oophorectomy    Other Right     wrist ganglion cyst removal    Removal of ovarian cyst(s)      Spine surgery procedure unlisted      vertiflex procedure                Social History     Socioeconomic History    Marital status:    Tobacco Use    Smoking status: Some Days     Current packs/day: 1.00     Types: Cigarettes    Smokeless tobacco: Never   Vaping Use    Vaping status: Never Used   Substance and Sexual Activity    Alcohol use: Yes     Comment: rare    Drug use: Yes     Comment: edible-CBD with THC occasionally     Social Determinants of Health     Food Insecurity: No Food Insecurity (2024)    Food Insecurity     Food Insecurity: Never true   Transportation Needs: No  Transportation Needs (7/8/2024)    Transportation Needs     Lack of Transportation: No   Housing Stability: Low Risk  (7/8/2024)    Housing Stability     Housing Instability: No              Review of Systems    Positive for stated Chief Complaint: Urinary Symptoms    Other systems are as noted in HPI.  Constitutional and vital signs reviewed.      All other systems reviewed and negative except as noted above.    Physical Exam     ED Triage Vitals [07/20/24 1455]   /67   Pulse 81   Resp 16   Temp 97.4 °F (36.3 °C)   Temp src Temporal   SpO2 99 %   O2 Device None (Room air)       Current Vitals:   Vital Signs  BP: 123/67  Pulse: 81  Resp: 16  Temp: 97.4 °F (36.3 °C)  Temp src: Temporal    Oxygen Therapy  SpO2: 99 %  O2 Device: None (Room air)          Physical Exam  Vitals and nursing note reviewed.   Constitutional:       General: She is not in acute distress.     Appearance: Normal appearance. She is not ill-appearing, toxic-appearing or diaphoretic.   Cardiovascular:      Rate and Rhythm: Normal rate and regular rhythm.   Pulmonary:      Effort: Pulmonary effort is normal. No respiratory distress.      Breath sounds: Normal breath sounds.   Abdominal:      Palpations: Abdomen is soft.      Tenderness: There is no abdominal tenderness. There is no right CVA tenderness, left CVA tenderness or guarding.   Neurological:      Mental Status: She is alert and oriented to person, place, and time.   Psychiatric:         Mood and Affect: Mood normal.         Behavior: Behavior normal.         ED Course     Labs Reviewed   URINALYSIS WITH CULTURE REFLEX - Abnormal; Notable for the following components:       Result Value    Ketones Urine 15 (*)     Urobilinogen Urine 1.0 (*)     All other components within normal limits   ICTOTEST - Normal   URINE CULTURE, ROUTINE       MDM      Differential diagnosis considered but not limited to interstitial cystitis, acute cystitis, pyelonephritis, nephrolithiasis    UA is not  suggestive of UTI. Urine bacterial culture pending.   No clinical indication to start antibiotics at this time. Patient in agreement to continue follow-up with PCP. Return for any new or worsening symptoms.          Medical Decision Making  Amount and/or Complexity of Data Reviewed  Labs: ordered. Decision-making details documented in ED Course.        Disposition and Plan     Clinical Impression:  1. UTI symptoms         Disposition:  Discharge  7/20/2024  4:27 pm    Follow-up:  Drake Coyne  [address] [address]  [phone]    Schedule an appointment as soon as possible for a visit      [facility] in [address] [address]  [phone]  Follow up  If symptoms worsen          Medications Prescribed:  Discharge Medication List as of 7/20/2024  4:28 PM

## 2024-07-25 ENCOUNTER — TELEPHONE (OUTPATIENT)
Dept: PHYSICAL THERAPY | Facility: HOSPITAL | Age: 69
End: 2024-07-25

## 2024-08-02 ENCOUNTER — HOSPITAL ENCOUNTER (OUTPATIENT)
Dept: ULTRASOUND IMAGING | Facility: HOSPITAL | Age: 69
Discharge: HOME | End: 2024-08-02
Attending: NURSE PRACTITIONER
Payer: MEDICARE

## 2024-08-02 DIAGNOSIS — Z96.652 STATUS POST LEFT KNEE REPLACEMENT: ICD-10-CM

## 2024-08-02 DIAGNOSIS — M79.89 LEFT LEG SWELLING: ICD-10-CM

## 2024-08-02 PROCEDURE — 93971 EXTREMITY STUDY: CPT | Performed by: NURSE PRACTITIONER

## 2025-03-17 RX ORDER — HYDROCODONE BITARTRATE AND ACETAMINOPHEN 5; 325 MG/1; MG/1
1-2 TABLET ORAL
Status: ON HOLD | COMMUNITY
End: 2025-04-07 | Stop reason: CLARIF

## 2025-03-17 RX ORDER — GABAPENTIN 400 MG/1
400 CAPSULE ORAL 3 TIMES DAILY
COMMUNITY

## 2025-03-21 ENCOUNTER — LABORATORY ENCOUNTER (OUTPATIENT)
Dept: LAB | Age: 70
End: 2025-03-21
Attending: ORTHOPAEDIC SURGERY
Payer: MEDICARE

## 2025-03-21 DIAGNOSIS — Z01.818 PRE-OP TESTING: ICD-10-CM

## 2025-03-21 LAB
ANTIBODY SCREEN: NEGATIVE
RH BLOOD TYPE: NEGATIVE

## 2025-03-21 PROCEDURE — 86900 BLOOD TYPING SEROLOGIC ABO: CPT

## 2025-03-21 PROCEDURE — 86901 BLOOD TYPING SEROLOGIC RH(D): CPT

## 2025-03-21 PROCEDURE — 86850 RBC ANTIBODY SCREEN: CPT

## 2025-04-07 ENCOUNTER — ANESTHESIA (OUTPATIENT)
Dept: SURGERY | Facility: HOSPITAL | Age: 70
End: 2025-04-07
Payer: MEDICARE

## 2025-04-07 ENCOUNTER — HOSPITAL ENCOUNTER (INPATIENT)
Facility: HOSPITAL | Age: 70
LOS: 1 days | Discharge: HOME | End: 2025-04-08
Attending: ORTHOPAEDIC SURGERY | Admitting: ORTHOPAEDIC SURGERY
Payer: MEDICARE

## 2025-04-07 ENCOUNTER — APPOINTMENT (OUTPATIENT)
Dept: GENERAL RADIOLOGY | Facility: HOSPITAL | Age: 70
End: 2025-04-07
Attending: ORTHOPAEDIC SURGERY
Payer: MEDICARE

## 2025-04-07 ENCOUNTER — ANESTHESIA EVENT (OUTPATIENT)
Dept: SURGERY | Facility: HOSPITAL | Age: 70
End: 2025-04-07
Payer: MEDICARE

## 2025-04-07 DIAGNOSIS — Z01.818 PRE-OP TESTING: Primary | ICD-10-CM

## 2025-04-07 PROCEDURE — 76000 FLUOROSCOPY <1 HR PHYS/QHP: CPT | Performed by: ORTHOPAEDIC SURGERY

## 2025-04-07 PROCEDURE — 4A11X4G MONITORING OF PERIPHERAL NERVOUS ELECTRICAL ACTIVITY, INTRAOPERATIVE, EXTERNAL APPROACH: ICD-10-PCS | Performed by: ORTHOPAEDIC SURGERY

## 2025-04-07 PROCEDURE — 01N10ZZ RELEASE CERVICAL NERVE, OPEN APPROACH: ICD-10-PCS | Performed by: ORTHOPAEDIC SURGERY

## 2025-04-07 PROCEDURE — 0RG20A0 FUSION OF 2 OR MORE CERVICAL VERTEBRAL JOINTS WITH INTERBODY FUSION DEVICE, ANTERIOR APPROACH, ANTERIOR COLUMN, OPEN APPROACH: ICD-10-PCS | Performed by: ORTHOPAEDIC SURGERY

## 2025-04-07 PROCEDURE — 0RT30ZZ RESECTION OF CERVICAL VERTEBRAL DISC, OPEN APPROACH: ICD-10-PCS | Performed by: ORTHOPAEDIC SURGERY

## 2025-04-07 PROCEDURE — 94640 AIRWAY INHALATION TREATMENT: CPT

## 2025-04-07 DEVICE — MODULUS CERVICAL, 6X19X16MM 10°
Type: IMPLANTABLE DEVICE | Site: SPINE CERVICAL | Status: FUNCTIONAL
Brand: MODULUS

## 2025-04-07 DEVICE — MAXCESS-C SCREW 16 DISTRACTION: Type: IMPLANTABLE DEVICE | Site: BACK

## 2025-04-07 DEVICE — MODULUS CERVICAL, 7X19X16MM 10°
Type: IMPLANTABLE DEVICE | Site: SPINE CERVICAL | Status: FUNCTIONAL
Brand: MODULUS

## 2025-04-07 DEVICE — IMPLANTABLE DEVICE: Type: IMPLANTABLE DEVICE | Site: BACK | Status: FUNCTIONAL

## 2025-04-07 DEVICE — I-FACTOR PUTTY, 1.0CC SYRINGE
Type: IMPLANTABLE DEVICE | Site: SPINE CERVICAL | Status: FUNCTIONAL
Brand: I-FACTOR PEPTIDE ENHANCED BONE GRAFT

## 2025-04-07 DEVICE — GRAFT BNE SUB PROCELLULAR SPNL MTRX OSTEOCEL: Type: IMPLANTABLE DEVICE | Site: SPINE CERVICAL | Status: FUNCTIONAL

## 2025-04-07 RX ORDER — MEPERIDINE HYDROCHLORIDE 25 MG/ML
12.5 INJECTION INTRAMUSCULAR; INTRAVENOUS; SUBCUTANEOUS AS NEEDED
Status: DISCONTINUED | OUTPATIENT
Start: 2025-04-07 | End: 2025-04-07 | Stop reason: HOSPADM

## 2025-04-07 RX ORDER — MONTELUKAST SODIUM 10 MG/1
10 TABLET ORAL NIGHTLY
Status: DISCONTINUED | OUTPATIENT
Start: 2025-04-07 | End: 2025-04-08

## 2025-04-07 RX ORDER — GABAPENTIN 400 MG/1
400 CAPSULE ORAL 3 TIMES DAILY
Status: DISCONTINUED | OUTPATIENT
Start: 2025-04-07 | End: 2025-04-08

## 2025-04-07 RX ORDER — FLUTICASONE PROPIONATE AND SALMETEROL 250; 50 UG/1; UG/1
1 POWDER RESPIRATORY (INHALATION) 2 TIMES DAILY
COMMUNITY

## 2025-04-07 RX ORDER — KETAMINE HYDROCHLORIDE 50 MG/ML
INJECTION, SOLUTION INTRAMUSCULAR; INTRAVENOUS AS NEEDED
Status: DISCONTINUED | OUTPATIENT
Start: 2025-04-07 | End: 2025-04-07 | Stop reason: SURG

## 2025-04-07 RX ORDER — DIAZEPAM 10 MG/2ML
2.5 INJECTION, SOLUTION INTRAMUSCULAR; INTRAVENOUS ONCE
Status: COMPLETED | OUTPATIENT
Start: 2025-04-07 | End: 2025-04-07

## 2025-04-07 RX ORDER — HYDROCODONE BITARTRATE AND ACETAMINOPHEN 5; 325 MG/1; MG/1
2 TABLET ORAL ONCE AS NEEDED
Status: DISCONTINUED | OUTPATIENT
Start: 2025-04-07 | End: 2025-04-07 | Stop reason: HOSPADM

## 2025-04-07 RX ORDER — SENNOSIDES 8.6 MG
17.2 TABLET ORAL NIGHTLY
Status: DISCONTINUED | OUTPATIENT
Start: 2025-04-07 | End: 2025-04-08

## 2025-04-07 RX ORDER — KETOROLAC TROMETHAMINE 15 MG/ML
15 INJECTION, SOLUTION INTRAMUSCULAR; INTRAVENOUS EVERY 6 HOURS
Status: DISCONTINUED | OUTPATIENT
Start: 2025-04-07 | End: 2025-04-08

## 2025-04-07 RX ORDER — DIPHENHYDRAMINE HCL 25 MG
25 CAPSULE ORAL EVERY 4 HOURS PRN
Status: DISCONTINUED | OUTPATIENT
Start: 2025-04-07 | End: 2025-04-08

## 2025-04-07 RX ORDER — FLUTICASONE PROPIONATE 50 MCG
2 SPRAY, SUSPENSION (ML) NASAL DAILY
Status: DISCONTINUED | OUTPATIENT
Start: 2025-04-07 | End: 2025-04-08

## 2025-04-07 RX ORDER — DEXAMETHASONE SODIUM PHOSPHATE 4 MG/ML
VIAL (ML) INJECTION AS NEEDED
Status: DISCONTINUED | OUTPATIENT
Start: 2025-04-07 | End: 2025-04-07 | Stop reason: SURG

## 2025-04-07 RX ORDER — FLUTICASONE PROPIONATE AND SALMETEROL 250; 50 UG/1; UG/1
1 POWDER RESPIRATORY (INHALATION) 2 TIMES DAILY
Status: DISCONTINUED | OUTPATIENT
Start: 2025-04-07 | End: 2025-04-08

## 2025-04-07 RX ORDER — ALBUTEROL SULFATE 0.83 MG/ML
2.5 SOLUTION RESPIRATORY (INHALATION) AS NEEDED
Status: DISCONTINUED | OUTPATIENT
Start: 2025-04-07 | End: 2025-04-07 | Stop reason: HOSPADM

## 2025-04-07 RX ORDER — METHOCARBAMOL 100 MG/ML
INJECTION, SOLUTION INTRAMUSCULAR; INTRAVENOUS
Status: COMPLETED
Start: 2025-04-07 | End: 2025-04-07

## 2025-04-07 RX ORDER — ATORVASTATIN CALCIUM 10 MG/1
10 TABLET, FILM COATED ORAL NIGHTLY
Status: DISCONTINUED | OUTPATIENT
Start: 2025-04-07 | End: 2025-04-08

## 2025-04-07 RX ORDER — DIAZEPAM 2 MG/1
2 TABLET ORAL EVERY 6 HOURS PRN
Status: DISCONTINUED | OUTPATIENT
Start: 2025-04-07 | End: 2025-04-08

## 2025-04-07 RX ORDER — HYDROMORPHONE HYDROCHLORIDE 1 MG/ML
INJECTION, SOLUTION INTRAMUSCULAR; INTRAVENOUS; SUBCUTANEOUS
Status: COMPLETED
Start: 2025-04-07 | End: 2025-04-07

## 2025-04-07 RX ORDER — ACETAMINOPHEN 10 MG/ML
1000 INJECTION, SOLUTION INTRAVENOUS ONCE
Status: COMPLETED | OUTPATIENT
Start: 2025-04-07 | End: 2025-04-07

## 2025-04-07 RX ORDER — LIDOCAINE HYDROCHLORIDE 10 MG/ML
INJECTION, SOLUTION EPIDURAL; INFILTRATION; INTRACAUDAL; PERINEURAL AS NEEDED
Status: DISCONTINUED | OUTPATIENT
Start: 2025-04-07 | End: 2025-04-07 | Stop reason: SURG

## 2025-04-07 RX ORDER — HYDROMORPHONE HYDROCHLORIDE 1 MG/ML
0.4 INJECTION, SOLUTION INTRAMUSCULAR; INTRAVENOUS; SUBCUTANEOUS EVERY 2 HOUR PRN
Status: DISCONTINUED | OUTPATIENT
Start: 2025-04-07 | End: 2025-04-08

## 2025-04-07 RX ORDER — CYCLOBENZAPRINE HCL 10 MG
1 TABLET ORAL 3 TIMES DAILY PRN
COMMUNITY

## 2025-04-07 RX ORDER — POLYETHYLENE GLYCOL 3350 17 G/17G
17 POWDER, FOR SOLUTION ORAL DAILY PRN
Status: DISCONTINUED | OUTPATIENT
Start: 2025-04-07 | End: 2025-04-08

## 2025-04-07 RX ORDER — OXYCODONE HYDROCHLORIDE 5 MG/1
5 TABLET ORAL EVERY 4 HOURS PRN
Status: DISCONTINUED | OUTPATIENT
Start: 2025-04-07 | End: 2025-04-08

## 2025-04-07 RX ORDER — HYDROMORPHONE HYDROCHLORIDE 1 MG/ML
0.2 INJECTION, SOLUTION INTRAMUSCULAR; INTRAVENOUS; SUBCUTANEOUS EVERY 2 HOUR PRN
Status: DISCONTINUED | OUTPATIENT
Start: 2025-04-07 | End: 2025-04-08

## 2025-04-07 RX ORDER — CYCLOBENZAPRINE HCL 5 MG
2.5 TABLET ORAL EVERY 6 HOURS PRN
Status: DISCONTINUED | OUTPATIENT
Start: 2025-04-07 | End: 2025-04-08

## 2025-04-07 RX ORDER — ACETAMINOPHEN 500 MG
1000 TABLET ORAL ONCE
Status: DISCONTINUED | OUTPATIENT
Start: 2025-04-07 | End: 2025-04-07 | Stop reason: HOSPADM

## 2025-04-07 RX ORDER — PANTOPRAZOLE SODIUM 40 MG/1
40 TABLET, DELAYED RELEASE ORAL
Status: DISCONTINUED | OUTPATIENT
Start: 2025-04-08 | End: 2025-04-08

## 2025-04-07 RX ORDER — METHYLPREDNISOLONE ACETATE 40 MG/ML
INJECTION, SUSPENSION INTRA-ARTICULAR; INTRALESIONAL; INTRAMUSCULAR; SOFT TISSUE AS NEEDED
Status: DISCONTINUED | OUTPATIENT
Start: 2025-04-07 | End: 2025-04-07 | Stop reason: HOSPADM

## 2025-04-07 RX ORDER — ALBUTEROL SULFATE 90 UG/1
2 INHALANT RESPIRATORY (INHALATION) EVERY 6 HOURS PRN
Status: DISCONTINUED | OUTPATIENT
Start: 2025-04-07 | End: 2025-04-08

## 2025-04-07 RX ORDER — ONDANSETRON 2 MG/ML
4 INJECTION INTRAMUSCULAR; INTRAVENOUS EVERY 6 HOURS PRN
Status: DISCONTINUED | OUTPATIENT
Start: 2025-04-07 | End: 2025-04-07 | Stop reason: HOSPADM

## 2025-04-07 RX ORDER — ACETAMINOPHEN 10 MG/ML
INJECTION, SOLUTION INTRAVENOUS
Status: COMPLETED
Start: 2025-04-07 | End: 2025-04-07

## 2025-04-07 RX ORDER — DIPHENHYDRAMINE HYDROCHLORIDE 50 MG/ML
25 INJECTION, SOLUTION INTRAMUSCULAR; INTRAVENOUS EVERY 4 HOURS PRN
Status: DISCONTINUED | OUTPATIENT
Start: 2025-04-07 | End: 2025-04-08

## 2025-04-07 RX ORDER — ONDANSETRON 2 MG/ML
4 INJECTION INTRAMUSCULAR; INTRAVENOUS EVERY 6 HOURS PRN
Status: DISCONTINUED | OUTPATIENT
Start: 2025-04-07 | End: 2025-04-08

## 2025-04-07 RX ORDER — VERAPAMIL HYDROCHLORIDE 120 MG/1
120 TABLET ORAL NIGHTLY
Status: DISCONTINUED | OUTPATIENT
Start: 2025-04-07 | End: 2025-04-08

## 2025-04-07 RX ORDER — VANCOMYCIN HYDROCHLORIDE 1 G/20ML
INJECTION, POWDER, LYOPHILIZED, FOR SOLUTION INTRAVENOUS AS NEEDED
Status: DISCONTINUED | OUTPATIENT
Start: 2025-04-07 | End: 2025-04-07 | Stop reason: HOSPADM

## 2025-04-07 RX ORDER — DIAZEPAM 10 MG/2ML
INJECTION, SOLUTION INTRAMUSCULAR; INTRAVENOUS
Status: COMPLETED
Start: 2025-04-07 | End: 2025-04-07

## 2025-04-07 RX ORDER — HYDROMORPHONE HYDROCHLORIDE 1 MG/ML
0.6 INJECTION, SOLUTION INTRAMUSCULAR; INTRAVENOUS; SUBCUTANEOUS EVERY 5 MIN PRN
Status: DISCONTINUED | OUTPATIENT
Start: 2025-04-07 | End: 2025-04-07 | Stop reason: HOSPADM

## 2025-04-07 RX ORDER — METHOCARBAMOL 100 MG/ML
1000 INJECTION, SOLUTION INTRAMUSCULAR; INTRAVENOUS ONCE
Status: COMPLETED | OUTPATIENT
Start: 2025-04-07 | End: 2025-04-07

## 2025-04-07 RX ORDER — HYDROCODONE BITARTRATE AND ACETAMINOPHEN 5; 325 MG/1; MG/1
1 TABLET ORAL ONCE AS NEEDED
Status: DISCONTINUED | OUTPATIENT
Start: 2025-04-07 | End: 2025-04-07 | Stop reason: HOSPADM

## 2025-04-07 RX ORDER — FLUTICASONE PROPIONATE AND SALMETEROL 250; 50 UG/1; UG/1
1 POWDER RESPIRATORY (INHALATION) DAILY
Status: DISCONTINUED | OUTPATIENT
Start: 2025-04-07 | End: 2025-04-07

## 2025-04-07 RX ORDER — QUETIAPINE 150 MG/1
150 TABLET, FILM COATED, EXTENDED RELEASE ORAL NIGHTLY
Status: DISCONTINUED | OUTPATIENT
Start: 2025-04-07 | End: 2025-04-08

## 2025-04-07 RX ORDER — ONDANSETRON 2 MG/ML
INJECTION INTRAMUSCULAR; INTRAVENOUS AS NEEDED
Status: DISCONTINUED | OUTPATIENT
Start: 2025-04-07 | End: 2025-04-07 | Stop reason: SURG

## 2025-04-07 RX ORDER — SODIUM PHOSPHATE, DIBASIC AND SODIUM PHOSPHATE, MONOBASIC 7; 19 G/230ML; G/230ML
1 ENEMA RECTAL ONCE AS NEEDED
Status: DISCONTINUED | OUTPATIENT
Start: 2025-04-07 | End: 2025-04-08

## 2025-04-07 RX ORDER — DIPHENHYDRAMINE HYDROCHLORIDE 50 MG/ML
12.5 INJECTION, SOLUTION INTRAMUSCULAR; INTRAVENOUS AS NEEDED
Status: DISCONTINUED | OUTPATIENT
Start: 2025-04-07 | End: 2025-04-07 | Stop reason: HOSPADM

## 2025-04-07 RX ORDER — HYDROMORPHONE HYDROCHLORIDE 1 MG/ML
0.4 INJECTION, SOLUTION INTRAMUSCULAR; INTRAVENOUS; SUBCUTANEOUS EVERY 5 MIN PRN
Status: DISCONTINUED | OUTPATIENT
Start: 2025-04-07 | End: 2025-04-07 | Stop reason: HOSPADM

## 2025-04-07 RX ORDER — NALOXONE HYDROCHLORIDE 0.4 MG/ML
0.08 INJECTION, SOLUTION INTRAMUSCULAR; INTRAVENOUS; SUBCUTANEOUS AS NEEDED
Status: DISCONTINUED | OUTPATIENT
Start: 2025-04-07 | End: 2025-04-07 | Stop reason: HOSPADM

## 2025-04-07 RX ORDER — MIDAZOLAM HYDROCHLORIDE 1 MG/ML
INJECTION INTRAMUSCULAR; INTRAVENOUS AS NEEDED
Status: DISCONTINUED | OUTPATIENT
Start: 2025-04-07 | End: 2025-04-07 | Stop reason: SURG

## 2025-04-07 RX ORDER — ACETAMINOPHEN 500 MG
1000 TABLET ORAL ONCE AS NEEDED
Status: DISCONTINUED | OUTPATIENT
Start: 2025-04-07 | End: 2025-04-07 | Stop reason: HOSPADM

## 2025-04-07 RX ORDER — SODIUM CHLORIDE, SODIUM LACTATE, POTASSIUM CHLORIDE, CALCIUM CHLORIDE 600; 310; 30; 20 MG/100ML; MG/100ML; MG/100ML; MG/100ML
INJECTION, SOLUTION INTRAVENOUS CONTINUOUS
Status: DISCONTINUED | OUTPATIENT
Start: 2025-04-07 | End: 2025-04-07 | Stop reason: HOSPADM

## 2025-04-07 RX ORDER — SCOPOLAMINE 1 MG/3D
1 PATCH, EXTENDED RELEASE TRANSDERMAL
Status: DISCONTINUED | OUTPATIENT
Start: 2025-04-07 | End: 2025-04-07 | Stop reason: HOSPADM

## 2025-04-07 RX ORDER — METOCLOPRAMIDE HYDROCHLORIDE 5 MG/ML
10 INJECTION INTRAMUSCULAR; INTRAVENOUS EVERY 8 HOURS PRN
Status: DISCONTINUED | OUTPATIENT
Start: 2025-04-07 | End: 2025-04-07 | Stop reason: HOSPADM

## 2025-04-07 RX ORDER — MELOXICAM 15 MG/1
15 TABLET ORAL DAILY
COMMUNITY

## 2025-04-07 RX ORDER — BISACODYL 10 MG
10 SUPPOSITORY, RECTAL RECTAL
Status: DISCONTINUED | OUTPATIENT
Start: 2025-04-07 | End: 2025-04-08

## 2025-04-07 RX ORDER — PHENYLEPHRINE HCL 10 MG/ML
VIAL (ML) INJECTION AS NEEDED
Status: DISCONTINUED | OUTPATIENT
Start: 2025-04-07 | End: 2025-04-07 | Stop reason: SURG

## 2025-04-07 RX ORDER — HYDROCODONE BITARTRATE AND ACETAMINOPHEN 5; 325 MG/1; MG/1
1 TABLET ORAL EVERY 6 HOURS PRN
COMMUNITY

## 2025-04-07 RX ORDER — EPHEDRINE SULFATE 50 MG/ML
INJECTION INTRAVENOUS AS NEEDED
Status: DISCONTINUED | OUTPATIENT
Start: 2025-04-07 | End: 2025-04-07 | Stop reason: SURG

## 2025-04-07 RX ORDER — SODIUM CHLORIDE, SODIUM LACTATE, POTASSIUM CHLORIDE, CALCIUM CHLORIDE 600; 310; 30; 20 MG/100ML; MG/100ML; MG/100ML; MG/100ML
INJECTION, SOLUTION INTRAVENOUS CONTINUOUS
Status: DISCONTINUED | OUTPATIENT
Start: 2025-04-07 | End: 2025-04-08

## 2025-04-07 RX ORDER — PREGABALIN 50 MG/1
50 CAPSULE ORAL 2 TIMES DAILY
Status: DISCONTINUED | OUTPATIENT
Start: 2025-04-07 | End: 2025-04-07

## 2025-04-07 RX ORDER — METOCLOPRAMIDE HYDROCHLORIDE 5 MG/ML
10 INJECTION INTRAMUSCULAR; INTRAVENOUS EVERY 8 HOURS PRN
Status: DISCONTINUED | OUTPATIENT
Start: 2025-04-07 | End: 2025-04-07

## 2025-04-07 RX ORDER — HYDROMORPHONE HYDROCHLORIDE 1 MG/ML
0.2 INJECTION, SOLUTION INTRAMUSCULAR; INTRAVENOUS; SUBCUTANEOUS EVERY 5 MIN PRN
Status: DISCONTINUED | OUTPATIENT
Start: 2025-04-07 | End: 2025-04-07 | Stop reason: HOSPADM

## 2025-04-07 RX ORDER — LABETALOL HYDROCHLORIDE 5 MG/ML
5 INJECTION, SOLUTION INTRAVENOUS EVERY 5 MIN PRN
Status: DISCONTINUED | OUTPATIENT
Start: 2025-04-07 | End: 2025-04-07 | Stop reason: HOSPADM

## 2025-04-07 RX ORDER — DOCUSATE SODIUM 100 MG/1
100 CAPSULE, LIQUID FILLED ORAL 2 TIMES DAILY
Status: DISCONTINUED | OUTPATIENT
Start: 2025-04-07 | End: 2025-04-08

## 2025-04-07 RX ADMIN — PHENYLEPHRINE HCL 100 MCG: 10 MG/ML VIAL (ML) INJECTION at 09:54:00

## 2025-04-07 RX ADMIN — SODIUM CHLORIDE, SODIUM LACTATE, POTASSIUM CHLORIDE, CALCIUM CHLORIDE: 600; 310; 30; 20 INJECTION, SOLUTION INTRAVENOUS at 09:31:00

## 2025-04-07 RX ADMIN — MIDAZOLAM HYDROCHLORIDE 2 MG: 1 INJECTION INTRAMUSCULAR; INTRAVENOUS at 09:31:00

## 2025-04-07 RX ADMIN — DEXAMETHASONE SODIUM PHOSPHATE 10 MG: 4 MG/ML VIAL (ML) INJECTION at 10:09:00

## 2025-04-07 RX ADMIN — EPHEDRINE SULFATE 10 MG: 50 INJECTION INTRAVENOUS at 10:13:00

## 2025-04-07 RX ADMIN — PHENYLEPHRINE HCL 100 MCG: 10 MG/ML VIAL (ML) INJECTION at 10:36:00

## 2025-04-07 RX ADMIN — EPHEDRINE SULFATE 10 MG: 50 INJECTION INTRAVENOUS at 09:59:00

## 2025-04-07 RX ADMIN — EPHEDRINE SULFATE 10 MG: 50 INJECTION INTRAVENOUS at 10:04:00

## 2025-04-07 RX ADMIN — LIDOCAINE HYDROCHLORIDE 25 MG: 10 INJECTION, SOLUTION EPIDURAL; INFILTRATION; INTRACAUDAL; PERINEURAL at 09:42:00

## 2025-04-07 RX ADMIN — PHENYLEPHRINE HCL 100 MCG: 10 MG/ML VIAL (ML) INJECTION at 09:52:00

## 2025-04-07 RX ADMIN — PHENYLEPHRINE HCL 100 MCG: 10 MG/ML VIAL (ML) INJECTION at 10:04:00

## 2025-04-07 RX ADMIN — PHENYLEPHRINE HCL 100 MCG: 10 MG/ML VIAL (ML) INJECTION at 10:11:00

## 2025-04-07 RX ADMIN — PHENYLEPHRINE HCL 100 MCG: 10 MG/ML VIAL (ML) INJECTION at 10:24:00

## 2025-04-07 RX ADMIN — PHENYLEPHRINE HCL 100 MCG: 10 MG/ML VIAL (ML) INJECTION at 10:34:00

## 2025-04-07 RX ADMIN — PHENYLEPHRINE HCL 100 MCG: 10 MG/ML VIAL (ML) INJECTION at 10:13:00

## 2025-04-07 RX ADMIN — ONDANSETRON 4 MG: 2 INJECTION INTRAMUSCULAR; INTRAVENOUS at 11:33:00

## 2025-04-07 RX ADMIN — PHENYLEPHRINE HCL 100 MCG: 10 MG/ML VIAL (ML) INJECTION at 09:59:00

## 2025-04-07 RX ADMIN — KETAMINE HYDROCHLORIDE 20 MG: 50 INJECTION, SOLUTION INTRAMUSCULAR; INTRAVENOUS at 09:55:00

## 2025-04-07 NOTE — PLAN OF CARE
Pt A&Ox 3-4, drowsy. VSS on 2L O2 NC. . SCD's on. Strong hand . No numbness or tingling. Ioban and gauze dressing intact. Dysphagia screening passed. Call light within reach. Will continue to monitor.

## 2025-04-07 NOTE — ANESTHESIA PREPROCEDURE EVALUATION
PRE-OP EVALUATION    Patient Name: Cathryn Snow    Admit Diagnosis: CERVICAL SPONDYLOISIS    Pre-op Diagnosis: CERVICAL SPONDYLOISIS    CERVICAL 4-CERVICAL 5, CERVICAL 5-CERVICAL 6 ANTERIOR CERVICAL DISCECTOMY AND FUSION    Anesthesia Procedure: CERVICAL 4-CERVICAL 5, CERVICAL 5-CERVICAL 6 ANTERIOR CERVICAL DISCECTOMY AND FUSION (Spine Cervical)  INTRAOPERATIVE NEURO MONITORING    Surgeons and Role:     * Richie Callaway MD - Primary    Pre-op vitals reviewed.  Temp: 97.1 °F (36.2 °C)  Pulse: 82  Resp: 16  BP: 117/72  SpO2: 99 %  Body mass index is 38.84 kg/m².    Current medications reviewed.  Hospital Medications:   acetaminophen (Tylenol Extra Strength) tab 1,000 mg  1,000 mg Oral Once    lactated ringers infusion   Intravenous Continuous    ceFAZolin (Ancef) 2g in 10mL IV syringe premix  2 g Intravenous Once    ceFAZolin (Ancef) 2 g/10mL IV syringe premix           Outpatient Medications:   Prescriptions Prior to Admission[1]    Allergies: Penicillins      Anesthesia Evaluation    Patient summary reviewed.    Anesthetic Complications  (+) history of anesthetic complications  History of: PONV       GI/Hepatic/Renal      (+) GERD                           Cardiovascular                  (+) hypertension                                     Endo/Other                           (+) arthritis       Pulmonary      (+) asthma                     Neuro/Psych      (+) depression           (+) neuromuscular disease    (+) psychiatric history         Bipolar, ADD    Nerve stimulator    MRI C spine  Degenerative cervical spondylosis. Findings are most prominent at the C5-C6 level. There is mild retrolisthesis with disc osteophyte complex resulting in moderate central canal stenosis, flattening of the cervical cord and intramedullary hyperintensity most suggestive of myelomalacia. Bilateral   foraminal stenosis is moderate to severe to the left and moderate to the right.           Past Surgical History:   Procedure  Laterality Date          x 3    Ep svt ablation      Hip replacement surgery Right     Knee replacement surgery      Knee surgery      left x 2 meniscal repair and right x 1 meniscal repair    Other Bilateral     salpingo oophorectomy    Other Right     wrist ganglion cyst removal    Removal of ovarian cyst(s)      Spine surgery procedure unlisted      vertiflex procedure     Social History     Socioeconomic History    Marital status:    Tobacco Use    Smoking status: Every Day     Current packs/day: 1.00     Types: Cigarettes    Smokeless tobacco: Never   Vaping Use    Vaping status: Never Used   Substance and Sexual Activity    Alcohol use: Yes     Comment: rare    Drug use: Yes     Comment: edible-CBD with THC occasionally for pain     History   Drug Use     Comment: edible-CBD with THC occasionally for pain     Available pre-op labs reviewed.               Airway      Mallampati: III  Mouth opening: >3 FB  TM distance: 4 - 6 cm  Neck ROM: full Cardiovascular      Rhythm: regular  Rate: normal     Dental             Pulmonary    Pulmonary exam normal.                 Other findings  Poor dentition            ASA: 3   Plan: general  NPO status verified and           Plan/risks discussed with: patient and child/children  Use of blood product(s) discussed with: patient and Other    Consented to blood products.          Present on Admission:  **None**             [1]   Medications Prior to Admission   Medication Sig Dispense Refill Last Dose/Taking    HYDROcodone-acetaminophen 5-325 MG Oral Tab Take 1-2 tablets by mouth every 4 to 6 hours as needed for Pain.   Taking As Needed    gabapentin 400 MG Oral Cap Take 1 capsule (400 mg total) by mouth 3 (three) times daily.   Taking    fluticasone propionate 50 MCG/ACT Nasal Suspension 2 sprays by Each Nare route daily.   Taking    verapamil 120 MG Oral Tab Take 1 tablet (120 mg total) by mouth at bedtime.   Taking    docusate sodium 100 MG Oral Cap Take 1  capsule (100 mg total) by mouth 2 (two) times daily.   Taking    lamoTRIgine 150 MG Oral Tab Take 2 tablets (300 mg total) by mouth at bedtime.   Taking    atorvastatin 10 MG Oral Tab Take 1 tablet (10 mg total) by mouth nightly.   Taking    Zolmitriptan 2.5 MG Oral Tab Take 1 tablet (2.5 mg total) by mouth See Admin Instructions. TAKE 1 TABLET BY MOUTH AS NEEDED FOR MIGRAINE. MAY REPEAT AFTER 2 HOURS. NOT TO EXCEED 2 TABLETS IN 24 HOURS   Taking    nicotine 21 MG/24HR Transdermal Patch 24 Hr Place 1 patch onto the skin daily.   Taking    omeprazole 20 MG Oral Capsule Delayed Release Take 1 capsule (20 mg total) by mouth every morning before breakfast.   Taking    QUEtiapine Fumarate  MG Oral Tablet 24 Hr Take 1 tablet (150 mg total) by mouth nightly.   Taking    fluticasone-salmeterol 250-50 MCG/DOSE Inhalation Aerosol Powder, Breath Activated Inhale 1 puff into the lungs daily.   Taking    Montelukast Sodium 10 MG Oral Tab Take 1 tablet (10 mg total) by mouth nightly.   Taking    Albuterol Sulfate  (90 Base) MCG/ACT Inhalation Aero Soln Inhale into the lungs every 6 (six) hours as needed for Wheezing.   Taking As Needed

## 2025-04-07 NOTE — PROGRESS NOTES
[facility]  Crisis/Safety Plan    Name:  Isa Bowser  MRN:  [id number]  Date:  2020    Warning signs that a crisis may be developing for me or I may be at risk:  1) increase in anxiety  2) increase in depression and feeling overwhelmed  3)any thoughts of self harm    Coping strategies I can use on my own (relaxation, physical activity, etc):  1) try to exercise daily  2) take walks in the park   3) watch tv, listen to music, or read a good book    Ways I can make my environment safe:  1)no weapons in the house  2)secure medications  3)secure sharps or other means of self harm    Things I want to tell myself when I feel a crisis developin) try to stay calm  2) remember there is help out there  3) get help before a crisis develops    People I can contact for support or distraction (and their phone numbers):  1) Tangela [phone]  2) Darlene [phone]    If I’m not able to reach my support people, or the above strategies don’t help, I can contact the following professionals, agencies, or hotlines:  1) Crisis Call Center ():  [phone] -OR- [phone]  2) Crisis Text Line ():  Text START to 656430  3)   4)     Reji Partida R.N.         Admission navigator complete  RN to complete skin assessment

## 2025-04-07 NOTE — DISCHARGE INSTRUCTIONS
Cervical Spine Procedure  Spine Center: Post-Op Instructions  Dr. Richie Callaway    Follow-Up  Please Schedule a follow-up appointment with the office for two weeks post-op.    Activity  Walking is encouraged daily. Increase distance and duration of walking on a daily basis.   Climb Stairs as needed.  10 lbs. lifting limit until follow-up appointment and further instructions given.  No driving while on pain medication or muscle relaxants.  Please minimize bending and twisting at the neck excessively. Practice gentle range of motion that is comfortable.  You can sleep in any position that is comfortable, pillows placed under knees while sleeping on your back or a pillow against your back while sleeping on the side may provide additional comfort.   No sexual activity for two weeks.  Physical Therapy will be arranged if needed during your post-op appointment.     Incision Care  Change dressing daily if draining. If not draining, then remove dressing at home and leave incision open to air on day 3 after your operation.   Keep incision clean and dry. If excessive drainage (several dressing changes needed per day) please take a picture of dressing and wound and call the office to report.  Do not apply ointments to incision.   Do not shower until there is no drainage from incision. Drainage usually stops 3-4 days after the operation.  When showering, water may run gently over the incision. After shower, pat dry gently. Replace dressing if wound still draining.   No bath, hot tub, or sauna for four weeks post-op.    Post-op Medications  Take pain medications only as prescribed.   Obtain over-the-counter stool softeners to take post-operatively while using pain medications. Examples include Colace or Dulcolax. Follow instructions as written on label. Monitor need for pain medication refills closely. Contact your pharmacy/physician office for a refill 72 hours before the refill is required.    Pain medications will NOT be  refilled after business hours or on weekends.   Continue with ice applied to the area of incision for 30 minutes at a time, multiple times per day. This provides pain control and helps reduce swelling.   You may take anti-inflammatories, Advil and Tylenol, for pain if they help decrease the amount of narcotic pain medications you take.    Soft Cervical Collar  Brace is worn for comfort and at sleep.  Patient may remove brace at any time, and the brace should not be worn in the shower.   Please wear soft collar in bed to keep neck in neutral position for 5-7 days post-op.    Dental Cleaning/Oral Surgery  No oral surgery or dental cleaning within 6 weeks post-op unless approval is granted from your physician.    Return to Work  Anticipate no work for the first two weeks post-op. Further time off will be determined at follow-up visits.   Light work may be possible 2-3 weeks post-op (desk jobs, light duty).  Anticipate a return to heavy work at approximately 6-12 weeks post-op.    When to Call Your Physician's Office   Two Temperatures or sustained temperatures above 101.1 F 4-6 hours apart.  Clear or excessive drainage from incision.  New weakness to extremities that substantially limits walking or arm use.   New onset of calf pain or swelling to lower extremities.  Any allergies or reactions to taking medications prescribed by spine surgeon.   Headache that worsens when standing and resolves when lying flat.    If you have any questions, please call our office at [phone]

## 2025-04-07 NOTE — ANESTHESIA POSTPROCEDURE EVALUATION
[facility]    Cathryn Snow Patient Status:  Inpatient   Age/Gender 69 year old female MRN [id number]   Location [facility] 3SW-A Attending Richie Callaway MD   Hosp Day # 0 PCP YVAN BARRAGAN       Anesthesia Post-op Note    CERVICAL 4-CERVICAL 5, CERVICAL 5-CERVICAL 6 ANTERIOR CERVICAL DISCECTOMY AND FUSION    Procedure Summary       Date: 04/07/25 Room / Location:  MAIN OR 10 / [facility] MAIN OR    Anesthesia Start: 0931 Anesthesia Stop: 1207    Procedures:       CERVICAL 4-CERVICAL 5, CERVICAL 5-CERVICAL 6 ANTERIOR CERVICAL DISCECTOMY AND FUSION (Spine Cervical)      INTRAOPERATIVE NEURO MONITORING Diagnosis: (CERVICAL SPONDYLOISIS)    Surgeons: Richie Callaway MD Anesthesiologist: Alyse Bailey MD    Anesthesia Type: general ASA Status: 3            Anesthesia Type: general    Vitals Value Taken Time   BP 97/80 04/07/25 1418   Temp 97.8 °F (36.6 °C) 04/07/25 1335   Pulse 89 04/07/25 1440   Resp 18 04/07/25 1418   SpO2 92 % 04/07/25 1440   Vitals shown include unfiled device data.        Patient Location: PACU    Anesthesia Type: general    Airway Patency: patent and extubated    Postop Pain Control: adequate    Mental Status: mildly sedated but able to meaningfully participate in the post-anesthesia evaluation    Nausea/Vomiting: none    Cardiopulmonary/Hydration status: stable euvolemic    Complications: no apparent anesthesia related complications    Postop vital signs: stable    Dental Exam: Unchanged from Preop    Patient to be discharged from PACU when criteria met.

## 2025-04-07 NOTE — ANESTHESIA PROCEDURE NOTES
Airway  Date/Time: 4/7/2025 9:44 AM  Urgency: elective      General Information and Staff    Patient location during procedure: OR  Anesthesiologist: Alyse Bailey MD  Performed: anesthesiologist   Performed by: Alyse Bailey MD  Authorized by: Alyse Bailey MD      Indications and Patient Condition  Indications for airway management: anesthesia  Spontaneous Ventilation: absent  Sedation level: deep  Preoxygenated: yes  Patient position: sniffing  Mask difficulty assessment: 1 - vent by mask    Final Airway Details  Final airway type: endotracheal airway      Successful airway: ETT  Cuffed: yes   Successful intubation technique: direct laryngoscopy  Endotracheal tube insertion site: oral  Blade: Leydi  Blade size: #3  ETT size (mm): 7.5    Cormack-Lehane Classification: grade I - full view of glottis  Placement verified by: capnometry   Cuff volume (mL): 7  Measured from: lips  ETT to lips (cm): 20  Number of attempts at approach: 1  Number of other approaches attempted: 0    Additional Comments  Smooth induction, smooth intubation, no trauma

## 2025-04-07 NOTE — BRIEF OP NOTE
Pre-Operative Diagnosis: CERVICAL SPONDYLOISIS     Post-Operative Diagnosis: CERVICAL SPONDYLOISIS      Procedure Performed:   CERVICAL 4-CERVICAL 5, CERVICAL 5-CERVICAL 6 ANTERIOR CERVICAL DISCECTOMY AND FUSION    Surgeons and Role:     * Richie Callaway MD - Primary    Assistant(s):  PA: Pallavi Christianson PA-C     Surgical Findings:      Specimen:      Estimated Blood Loss: Blood Output: 50 mL (4/7/2025 11:47 AM)      Dictation Number:      Pallavi Christianson PA-C  4/7/2025  11:56 AM

## 2025-04-07 NOTE — CONSULTS
.Reason for consult: periop mgmt    Consulted by: Dr. Callaway    PCP: YVAN BARRAGAN      History of Present Illness: Patient is a 69 year old female who presented for C4-6 discectomy with anterior fusion. No sob/nausea. Having some pain, still groggy.      PMH:  Past Medical History:    ADD (attention deficit disorder)    Anxiety state    Arrhythmia    SVT    Arthritis    Asthma (HCC)    Back problem    Bipolar affective (HCC)    Cyst of right kidney    Depression    Esophageal reflux    Hiatal hernia    High blood pressure    High cholesterol    History of stomach ulcers    Migraines    Muscle weakness    uses cane as needed    Neuropathy    bilateral feet    Pneumonia due to organism    PONV (postoperative nausea and vomiting)    Renal disorder    kidney tumor?    Visual impairment    glasses        PSH:  Past Surgical History:   Procedure Laterality Date          x 3    Ep svt ablation      Hip replacement surgery Right     Knee replacement surgery      Knee surgery      left x 2 meniscal repair and right x 1 meniscal repair    Other Bilateral     salpingo oophorectomy    Other Right     wrist ganglion cyst removal    Removal of ovarian cyst(s)      Spine surgery procedure unlisted      vertiflex procedure        Home Medications:  Medications Taking[1]    Scheduled Medication:   ceFAZolin        gabapentin  400 mg Oral TID    sennosides  17.2 mg Oral Nightly    docusate sodium  100 mg Oral BID    ketorolac  15 mg Intravenous Q6H     Continuous Infusing Medication:   lactated ringers Stopped (25 1158)     PRN Medication:  ceFAZolin    sodium chloride    polyethylene glycol (PEG 3350)    magnesium hydroxide    bisacodyl    fleet enema    ondansetron    metoclopramide    diphenhydrAMINE **OR** diphenhydrAMINE    benzocaine-menthol    oxyCODONE **OR** oxyCODONE    HYDROmorphone **OR** HYDROmorphone    cyclobenzaprine    diazePAM     ALL:  Allergies[2]     Soc Hx:  Social History     Tobacco Use     Smoking status: Every Day     Current packs/day: 1.00     Types: Cigarettes    Smokeless tobacco: Never   Substance Use Topics    Alcohol use: Yes     Comment: rare        Fam Hx  Family History   Problem Relation Age of Onset    Other (bladder cancer) Other        Review of Systems  Comprehensive ROS reviewed and negative except for what's stated above.  Including negative for chest pain, shortness of breath, syncope.       OBJECTIVE:  BP 97/80 (BP Location: Right arm)   Pulse 78   Temp 97.8 °F (36.6 °C)   Resp 18   Ht 5' 5\" (1.651 m)   Wt 233 lb 6.4 oz (105.9 kg)   SpO2 91%   BMI 38.84 kg/m²   General:  Alert, no distress, appears stated age.   Head:  Normocephalic, without obvious abnormality, atraumatic.   Eyes:  Sclera anicteric, No conjunctival pallor, EOMs intact.    Nose: Nares normal. Septum midline. Mucosa normal. No drainage.   Throat: Lips, mucosa, and tongue normal. Teeth and gums normal.   Neck: Supple, symmetrical, trachea midline, no cervical or supraclavicular lymph adenopathy, thyroid: no enlargment/tenderness/nodules appreciated   Lungs:   Clear to auscultation bilaterally. Normal effort   Chest wall:  No tenderness or deformity.   Heart:  Regular rate and rhythm, S1, S2 normal, no murmur, rub or gallop appreciated   Abdomen:   Soft, non-tender. Bowel sounds normal. No masses,  No organomegaly. Non distended   Extremities: Extremities normal, atraumatic, no cyanosis or edema.   Skin: Skin color, texture, turgor normal. No rashes or lesions.    Neurologic: Normal strength, no focal deficit appreciated       Diagnostics:     Radiology: XR FLUOROSCOPY C-ARM TIME LESS THAN 1 HOUR (CPT=76000)    Result Date: 4/7/2025  PROCEDURE:  XR FLUOROSCOPY C-ARM TIME <1 HOUR  (CPT=76000)  INDICATIONS:  Cervical fusion performed by the attending spine surgeon.  COMPARISON:  None.   TECHNIQUE:  FLUOROSCOPY IMAGES OBTAINED:  6 FLUOROSCOPY TIME:  11 seconds TECHNOLOGIST TIME:  1 hour 5 minutes RADIATION DOSE  (AIR KERMA PRODUCT):  .7826mGy   FINDINGS:  Multiple fluoroscopic images submitted by the attending spine surgeon, with evidence of anterior hardware fusion and interbody cage placements extending from C4-5 through C5-6.            CONCLUSION:  As above.   LOCATION:  [facility]    Dictated by (CST): Darren Fang DO on 4/07/2025 at 1:48 PM     Finalized by (CST): Darren Fang DO on 4/07/2025 at 1:49 PM            ASSESSMENT / PLAN:    # cervical radiculopathy s/p C4-6 anterior discectomy/fusion  Pt/ot, prn pain meds, bowel regimen, SCDs    # htn/hl  verapamil  lipitor    # asthma  katerynaair      Meredith Trinidad MD  [facility]  Pager [phone]  Answering Service number: [phone]         [1]   Outpatient Medications Marked as Taking for the 4/7/25 encounter (Hospital Encounter)   Medication Sig Dispense Refill    Meloxicam 15 MG Oral Tab Take 1 tablet (15 mg total) by mouth daily.      Naloxone HCl 4 MG/0.1ML Nasal Liquid 4 mg by Nasal route as needed.      HYDROcodone-acetaminophen 5-325 MG Oral Tab Take 1 tablet by mouth every 6 (six) hours as needed for Pain.      gabapentin 400 MG Oral Cap Take 1 capsule (400 mg total) by mouth 3 (three) times daily.      fluticasone propionate 50 MCG/ACT Nasal Suspension 2 sprays by Each Nare route daily.      verapamil 120 MG Oral Tab Take 1 tablet (120 mg total) by mouth at bedtime.      docusate sodium 100 MG Oral Cap Take 1 capsule (100 mg total) by mouth 2 (two) times daily.      lamoTRIgine 150 MG Oral Tab Take 2 tablets (300 mg total) by mouth at bedtime.      atorvastatin 10 MG Oral Tab Take 1 tablet (10 mg total) by mouth nightly.      Zolmitriptan 2.5 MG Oral Tab Take 1 tablet (2.5 mg total) by mouth See Admin Instructions. TAKE 1 TABLET BY MOUTH AS NEEDED FOR MIGRAINE. MAY REPEAT AFTER 2 HOURS. NOT TO EXCEED 2 TABLETS IN 24 HOURS      nicotine 21 MG/24HR Transdermal Patch 24 Hr Place 1 patch onto the skin daily.      omeprazole 20 MG Oral Capsule Delayed Release  Take 1 capsule (20 mg total) by mouth every morning before breakfast.      QUEtiapine Fumarate  MG Oral Tablet 24 Hr Take 1 tablet (150 mg total) by mouth nightly.      fluticasone-salmeterol 250-50 MCG/DOSE Inhalation Aerosol Powder, Breath Activated Inhale 1 puff into the lungs daily.      Montelukast Sodium 10 MG Oral Tab Take 1 tablet (10 mg total) by mouth nightly.      Albuterol Sulfate  (90 Base) MCG/ACT Inhalation Aero Soln Inhale 2 puffs into the lungs every 6 (six) hours as needed for Wheezing.     [2]   Allergies  Allergen Reactions    Penicillins HIVES

## 2025-04-07 NOTE — OPERATIVE REPORT
Operative Note     Patient Name: Cathryn Snow  Date: 4/7/2025  Preoperative Diagnosis: Cervical Radiculopathy/Cervical spondylolisthesis C4-6  Postoperative Diagnosis: Same  Primary Surgeon: Richie Callaway MD  Assistant: Pallavi XIE  Procedures:   C4-6 Anterior Cervical Discectomy/Fusion CPT 38818 (1x22552)  C4-6 Anterior Instrumentation   CPT 84309  Use of Titanium Interbody Graft    CPT 20853 x2      Anesthesia: General Endotracheal Anesthesia  Estimated Blood Loss: 50cc  Drains: None  Implants:   Nuvasive C360 Anterior Cervical Plate and Screws 19 and 17mm  Nuvasive Modulus interbody cage [id number] x 6 and 7mmheight  Bone Graft: 1cc osteocel and 1cc ifactor and 2cc local   Specimen: None  Condition: Stable  Complications: None     Surgical Indications:   Cathryn Snow is an 69 year old female who presents with neck issues refractory to nonsurgical care.  The option of surgery was discussed with the patient.  Risks include, but are not limited to wound complications, bleeding, infection, neurologic injury or onset of a new neurological issue including paralysis, dural tear, pseudoarthrosis (particularly in longer constructs and in smokers/diabetics), fracture, hardware failure, adjacent segment or junctional breakdown, recurrent laryngeal nerve palsy, Marii's syndrome, dysphagia or dysphonia (These are usually temporary and will resolve within a few weeks to few months timeframe), hematoma requiring evacuation, repeat intubation after surgery, esophageal or tracheal injury, vertebral artery injury (stroke), and the need for possible additional future surgery.  We also discussed the possible persistence of symptoms and adjacent segment degeneration.  The patient verbalized their understanding and wished to proceed with surgery.        Surgical Procedure:   After careful identification patient was taken to the OR, the patient was administered prophylactic antibiotics.  After successful induction of  general endotracheal anesthesia, the patient was positioned supine on the operating table with gentle neck extension and securing the arms to the side with gentle traction with shoulder tape.  All bony prominences were padded and protected. The neck was then prepped and draped in the usual sterile fashion.   A safety timeout was performed identifying the patient by name, MRN, and date of birth; the nature of the procedure, surgical site, and concerns were addressed with the operating room staff. All were in agreement and we proceeded with the procedure.                A transverse incision, consistent with the Leigh-Wright approach, was made.  The skin was incised with a scalpel and subcutaneous dissection was carried out with electrocautery.  The subplatysmal membrane was then dissected free.  The plane between the deep cervical fascia and the carotid sheath was developed bluntly.  The carotid was palpated, protected, and retracted laterally.  After exposing the prevertebral fascia, sirisha clamp was used to identify the disc space. A lateral flouro was then obtained to confirm the appropriate surgical level(s) prior to proceeding with discectomy.  The edges of the longus coli muscles elevated from their undersurface with  electrocautery from C4-6.  The Trimline retractor of appropriate size was placed.  Pinckney pins were then placed at the most cephalad and caudal vertebral bodies.       Mild distraction of the interspace was afforded at C5-6. A #15 blade was used to create an initial annulotomy. Complete discectomy was complete from uncinate to uncinate with a combination of curettes, pituitary and Kerrison rongeurs.  The overhanging osteophyte at the cephalad cervical body was resected and saved for bone grafting.  After decompressing the posterior osteophytes and the neuroforamen, the PLL was resected with a #2 Kerrison.  A blunt nerve probe can then be passed into the bilateral neural foramen without any  resistance.  Next high-speed bur was utilized to decorticate the most lateral aspects of the disc space.  Next we trialed and then placed an appropriate sized titanium interbody device packed with local bone graft and allograft into position.  Bone graft was then deposited into the disc space on the lateral aspects.    Similarly, the other level at C4-5 followed the same combination of steps for complete discectomy/foraminal decompression and interbody placement     At this point the caspar pins were then removed, hemostasis was achieved an appropriate sized plate was selected and applied.  The plate was a separate plate and not integral to the interbody implant.Screws were placed and a radiograph obtained to confirm position of the graft and evaluate plate fixation     All bleeders were meticulously controlled using bipolar electrocautery and floseal. The remainder of the floseal was mixed with 40mg of depomedrol and placed over the plate for minimization of dysphagia. 100mg of vancomycin powder was placed in the wound. The wound was thoroughly irrigated at the time of closure.  There was no active bleeding.  Closure was done in layers with interrupted 2-0 Vicryl for the fascia, 3-0 vicry for subcutaneous incision.  The skin was closed with a Monocryl suture.  Steri-Strips and a sterile dressing were then applied.  The patient was woken from anesthesia and transferred to the PACU in stable condition.                  A physician assistant was necessary during the case to provide positioning, exposure, hemostasis, safety and retraction and to manage wound suction so that I could operate with two hands     Richie Callaway MD  Division of Spine Surgery  [facility]

## 2025-04-07 NOTE — H&P
The scanned referenced H&P was reviewed by Pallavi Christianson PA-C on 4/7/2025, and no significant changes have occurred in the patient's condition since the H&P was performed.  H&P was performed by Dr. Juan on 3/31/2025.

## 2025-04-08 VITALS
SYSTOLIC BLOOD PRESSURE: 121 MMHG | TEMPERATURE: 98 F | OXYGEN SATURATION: 96 % | RESPIRATION RATE: 16 BRPM | WEIGHT: 233.38 LBS | BODY MASS INDEX: 38.88 KG/M2 | HEART RATE: 72 BPM | HEIGHT: 65 IN | DIASTOLIC BLOOD PRESSURE: 60 MMHG

## 2025-04-08 PROCEDURE — 97161 PT EVAL LOW COMPLEX 20 MIN: CPT

## 2025-04-08 PROCEDURE — 97116 GAIT TRAINING THERAPY: CPT

## 2025-04-08 PROCEDURE — 97535 SELF CARE MNGMENT TRAINING: CPT

## 2025-04-08 PROCEDURE — 97165 OT EVAL LOW COMPLEX 30 MIN: CPT

## 2025-04-08 NOTE — PLAN OF CARE
Pt A&Ox4, VSS on RA. IS encouraged. Pain managed with PO and IV pain medication. Surgical dressing C/D/I, ice applied. Up SBA with or w/o the walker. Last bm 4/6, voiding in the bathroom. PT/OT to see. Dc when medically cleared. Call light in reach, safety measures in place.  *patient's nerve stimulator remote at bedside, stimulator currently off

## 2025-04-08 NOTE — PROGRESS NOTES
S: Patient awake in bed. Denies difficulty breathing or swallowing. Has some upper arm pain and neck soreness. Denies numbness or tingling.     Inspection: Awake Alert  No acute distress.     Blood pressure 125/70, pulse 78, temperature 98.1 °F (36.7 °C), temperature source Oral, resp. rate 16, height 5' 5\" (1.651 m), weight 233 lb 6.4 oz (105.9 kg), SpO2 96%.    No results for input(s): \"RBC\", \"HGB\", \"HCT\", \"MCV\", \"MCH\", \"MCHC\", \"RDW\", \"NEPRELIM\", \"WBC\", \"PLT\" in the last 168 hours.    Incision:  Dressing in place, C/D/I     Neck supple    Strength testing:   Left   Right   5/5 Shoulder Shrug 5/5 Shoulder Shrug   5/5 Deltoid  5/5 Deltoid    5/5 Biceps  5/5 Biceps   5/5 Triceps  5/5 Triceps   5/5 Digit Flexion  5/5 Digit Flexion    5/5 Intrinsics  5/5 Intrinsics       Sensation: Normal sensation noted to light touch and pressure throughout bilateral upper extremities.    Calves supple NT bilaterally    A/P: POD # 1 s/p ACDF C4-6    P: Patient doing well. Continue current management. Work with PT/OT. Cleared for discharge once cleared by all services. Medication prescribed pre-operatively.     Pallavi Christianson PA-C

## 2025-04-08 NOTE — OCCUPATIONAL THERAPY NOTE
OCCUPATIONAL THERAPY EVALUATION - INPATIENT    Room Number: 381/381-A  Evaluation Date: 4/8/2025     Type of Evaluation: Initial  Presenting Problem: s/p C4-C6 ACDF 4/7/25    Physician Order: IP Consult to Occupational Therapy  Reason for Therapy:  ADL/IADL Dysfunction and Discharge Planning    OCCUPATIONAL THERAPY ASSESSMENT   Patient is a 69 year old female admitted on 4/7/2025 with Presenting Problem: s/p C4-C6 ACDF 4/7/25. Co-Morbidities : HTN, asthma, bipolar disorder, R BARBARA, L TKA  Patient is currently functioning near baseline with ADLs and functional transfers.  Prior to admission, patient's baseline is grossly independent.  Patient met all OT goals at supervision to Mod I level.  Patient reports no further questions/concerns at this time.     No further skilled OT needs at this time.    Recommendations for nursing staff:   Transfers: 1-person  Toileting location: Toilet    EVALUATION SESSION:  Patient at start of session: supine    FUNCTIONAL TRANSFER ASSESSMENT  Sit to Stand: Edge of Bed  Edge of Bed: Supervision  Toilet Transfer: Supervision (standard height, light use of grab bar, reports has commode at home)    BED MOBILITY  Supine to Sit : Minimal Assist (max cues for logroll, reports plans to sleep in recliner initially)    BALANCE ASSESSMENT     FUNCTIONAL ADL ASSESSMENT  Grooming Standing: Modified Independent  UB Dressing Seated: Modified Independent (declined, simulated Mod I)  LB Dressing Seated: Supervision (sufficient reach without AE for sock, underwear and pants management; reports friend helps with tying shoes baseline, discussed option of slip-on shoes or elastic shoelaces)  LB Dressing Standing: Supervision  Toileting Seated: Supervision  Toileting Standing: Supervision    ACTIVITY TOLERANCE:                          O2 SATURATIONS       COGNITION  Overall Cognitive Status:  WFL - within functional limits  Attention Span:  attends with cues to redirect  Memory:  decreased recall of  precautions    COGNITION ASSESSMENTS     Upper Extremity:   ROM: within functional limits   Strength: is within functional limits     EDUCATION PROVIDED  Patient Education : Role of Occupational Therapy; Functional Transfer Techniques; Fall Prevention; Surgical Precautions; Posture/Positioning; Proper Body Mechanics  Patient's Response to Education: Verbalized Understanding    Equipment used: RW  Demonstrates functional use    Therapist comments: Patient motivated and participatory with encouragement, frequent redirection to task required with intermittent safety cues. Educated on cervical spine precautions and incorporation into ADLs and transfers, followed with fair return demo, intermittent cueing to maintain required. Patient performed all ADLs and functional transfers at Supervision to Mod I level. Patient aware of recommendation for initial supervision at discharge, including supervision for shower transfers and increased assist with IADLs; patient reports will have initial supervision/assist as needed from her friend/roommate at discharge. Patient reports no further questions or concerns regarding discharge home to ADLs.       Patient End of Session: Up in chair, Needs met, Call light within reach, RN aware of session/findings, All patient questions and concerns addressed, Hospital anti-slip socks, SCDs in place, Alarm set    OCCUPATIONAL PROFILE    HOME SITUATION  Type of Home: House  Home Layout: One level  Lives With: Roommate    Toilet and Equipment: Standard height toilet, 3-in-1 commode  Shower/Tub and Equipment: Tub-shower combo, Grab bar, Shower chair  Other Equipment: Reacher, Long-handled shoehorn    Occupation/Status: retired RN        Patient Regularly Uses: Glasses    Prior Level of Function: Patient reports independent in most BADLs (friend/roommate sometimes helps with tying shoes) and functional mobility without device prior to admission, but does admit to holding onto furniture and walls as  she walks. Friend/roommate performs most IADLs.    SUBJECTIVE  \"I walk with the widest gait you've ever seen\"    PAIN ASSESSMENT  Rating: Unable to rate  Location: back of neck/shoulders  Management Techniques: Repositioning, Body mechanics    OBJECTIVE  Precautions: Spine, Bed/chair alarm  Fall Risk: Standard fall risk    WEIGHT BEARING RESTRICTION       AM-PAC ‘6-Clicks’ Inpatient Daily Activity Short Form  -   Putting on and taking off regular lower body clothing?: A Little (supervision)  -   Bathing (including washing, rinsing, drying)?: A Little (supervision)  -   Toileting, which includes using toilet, bedpan or urinal? : A Little (supervision)  -   Putting on and taking off regular upper body clothing?: None  -   Taking care of personal grooming such as brushing teeth?: None  -   Eating meals?: None    AM-PAC Score:  Score: 21  Approx Degree of Impairment: 32.79%  Standardized Score (AM-PAC Scale): 44.27    ADDITIONAL TESTS     NEUROLOGICAL FINDINGS      PLAN   Patient has been evaluated and presents with no skilled Occupational Therapy needs at this time.  Patient discharged from Occupational Therapy services.  Please re-order if a new functional limitation presents during this admission.    OT Device Recommendations: None    Patient Evaluation Complexity Level:   Occupational Profile/Medical History LOW - Brief history including review of medical or therapy records    Specific performance deficits impacting engagement in ADL/IADL LOW  1 - 3 performance deficits    Client Assessment/Performance Deficits LOW - No comorbidities nor modifications of tasks    Clinical Decision Making LOW - Analysis of occupational profile, problem-focused assessments, limited treatment options    Overall Complexity LOW     OT Session Time: 20 minutes  Self-Care Home Management: 8 minutes

## 2025-04-08 NOTE — PHYSICAL THERAPY NOTE
PHYSICAL THERAPY EVALUATION - INPATIENT     Room Number: 381/381-A  Evaluation Date: 4/8/2025  Type of Evaluation: Initial  Physician Order: PT Eval and Treat    Presenting Problem: C4-C6 ACDF  Co-Morbidities : HTN, asthma, bipolar disorder, R BARBARA, L TKA  Reason for Therapy: Mobility Dysfunction and Discharge Planning    Procedure Performed  4/7/25 Dr Callaway:   CERVICAL 4-CERVICAL 5, CERVICAL 5-CERVICAL 6 ANTERIOR CERVICAL DISCECTOMY AND FUSION    PHYSICAL THERAPY ASSESSMENT   Patient is a 69 year old female admitted 4/7/2025 for planned cervical spine surgery.   Patient is currently functioning near baseline with bed mobility, transfers, gait, stair negotiation, maintaining seated position, and standing prolonged periods. Prior to admission, patient's baseline is indep.     Patient will benefit from continued skilled PT Services with no additional skilled services but increased support at home.    PLAN  Patient has been evaluated and presents with no skilled Physical Therapy needs at this time.  Patient discharged from Physical Therapy services.  Please re-order if a new functional limitation presents during this admission.    PT Device Recommendation: Rolling walker    GOALS  Patient was able to achieve the following goals ...    Patient was able to transfer At previous, functional level  Safely and independently   Patient able to ambulate on level surfaces Safely with RW     HOME SITUATION  Type of Home: House  Home Layout: One level  Stairs to Enter : 4   Railing: Yes              Lives With: Roommate    Drives: No   Patient Regularly Uses: Glasses     Prior Level of [place]:   Per pt - lives in one story home with roommate. Ambulates indep, but does own RW and cane. No recent falls. Hasn't been driving due to sensations in arms.    SUBJECTIVE  \"Well I haven't gotten any sleep.\"    OBJECTIVE  Precautions: Bed/chair alarm, Spine  Fall Risk: Standard fall risk    WEIGHT BEARING RESTRICTION     PAIN  ASSESSMENT  Ratin  Location: incisional/anterior neck  Management Techniques: Activity promotion, Body mechanics, Repositioning    COGNITION  Overall Cognitive Status:  WFL - within functional limits    RANGE OF MOTION AND STRENGTH ASSESSMENT  Upper extremity ROM and strength are within functional limits   Lower extremity ROM is within functional limits   Lower extremity strength is within functional limits     BALANCE  Static Sitting: Good  Dynamic Sitting: Fair +  Static Standing: Fair  Dynamic Standing: Fair -    ADDITIONAL TESTS                                    ACTIVITY TOLERANCE                         O2 WALK       NEUROLOGICAL FINDINGS                        AM-PAC '6-Clicks' INPATIENT SHORT FORM - BASIC MOBILITY  How much difficulty does the patient currently have...  Patient Difficulty: Turning over in bed (including adjusting bedclothes, sheets and blankets)?: None   Patient Difficulty: Sitting down on and standing up from a chair with arms (e.g., wheelchair, bedside commode, etc.): None   Patient Difficulty: Moving from lying on back to sitting on the side of the bed?: None   How much help from another person does the patient currently need...   Help from Another: Moving to and from a bed to a chair (including a wheelchair)?: None   Help from Another: Need to walk in hospital room?: A Little   Help from Another: Climbing 3-5 steps with a railing?: A Little       AM-PAC Score:  Raw Score: 22   Approx Degree of Impairment: 20.91%   Standardized Score (AM-PAC Scale): 53.28   CMS Modifier (G-Code): CJ    FUNCTIONAL ABILITY STATUS  Gait Assessment   Functional Mobility/Gait Assessment  Gait Assistance: Supervision  Distance (ft): 150, 150  Assistive Device: Rolling walker  Pattern: Within Functional Limits  Stairs: Stairs  How Many Stairs: 4  Device: 1 Rail  Assist: Supervision  Pattern: Ascend and Descend  Ascend and Descend : Step to    Skilled Therapy Provided   Educated on spine precautions: no  bending, lifting, twisting  Educated on log roll technique for bed mobility  Educated on importance of hourly change in positions/ambulation at time of dc  Encouraged continued use of ice for pain and swelling management    Bed mobility> sit to stand to RW> ambulated 150 feet with RW> ascended/descended 4 steps> ambulated 150 feet back to room> completed toileting indep> returned supine in bed at end of session    Bed Mobility:  Rolling: educated on log roll     Supine to sit: supervision    Sit to supine: supervision      Transfer Mobility:  Sit to stand: supervision to RW   Stand to sit: supervision  Gait = supervision with RW x 150 feet, 150 feet- wide base of support    Therapist's comments:  RN gave clearance to see patient. Discussed role and goal of physical therapy in hospital setting. Pt in agreement to session.     Exercise/Education Provided:  Bed mobility  Body mechanics  Energy conservation  Functional activity tolerated  Gait training  Posture  Transfer training    Patient End of Session: Needs met, Call light within reach, RN aware of session/findings, All patient questions and concerns addressed, Hospital anti-slip socks, Alarm set, SCDs in place, Discussed recommendations with /, In bed    Patient Evaluation Complexity Level:  History Moderate - 1 or 2 personal factors and/or co-morbidities   Examination of body systems Low -  addressing 1-2 elements   Clinical Presentation Low- Stable   Clinical Decision Making Low Complexity     PT Session Time: 30 minutes  Gait Training: 15 minutes

## 2025-04-08 NOTE — PROGRESS NOTES
NURSING DISCHARGE NOTE    Discharged Home via Wheelchair.  Accompanied by Family member  Belongings Taken by patient/family.  AVS printed and discussed with patient, she verbalized understanding.

## 2025-04-13 NOTE — PROGRESS NOTES
.[facility] Hospitalist note    PCP: YVAN BARRAGAN    Chief Complaint:  F/u cervical discectomy/fusion    SUBJECTIVE:  Swallowing okay, pain controlled. No nausea. Noted some wheezing earlier but better with inhaler.    OBJECTIVE:       Intake/Output:  No intake or output data in the 24 hours ending 04/13/25 1705    Last 3 Weights   04/07/25 0802 233 lb 6.4 oz (105.9 kg)   03/17/25 1629 222 lb (100.7 kg)   07/20/24 1455 225 lb (102.1 kg)   07/08/24 0947 225 lb 15.5 oz (102.5 kg)   06/12/24 1359 225 lb (102.1 kg)       Exam  Gen: No acute distress  HEENT: anicteric sclera, MMM  Pulm: Lungs clear, normal respiratory effort  CV: Heart with regular rate and rhythm, no peripheral edema  Abd: Abdomen soft, nontender, nondistended, no organomegaly, bowel sounds present  MSK: Full range of motion in extremities, no clubbing, no cyanosis  Skin: no rashes or lesions  Neuro: A&OX3, no focal deficits    Data Review:         Labs:     No results for input(s): \"WBC\", \"HGB\", \"MCV\", \"PLT\", \"BAND\", \"NE\", \"LYMABS\", \"INR\" in the last 168 hours.    Invalid input(s): \"LYM#\", \"MONO#\", \"BASOS#\", \"EOSIN#\"    No results for input(s): \"NA\", \"K\", \"CL\", \"CO2\", \"BUN\", \"CREATSERUM\", \"CA\", \"CAION\", \"MG\", \"PHOS\", \"GLU\" in the last 168 hours.    No results for input(s): \"ALT\", \"AST\", \"ALB\", \"AMYLASE\", \"LIPASE\" in the last 168 hours.    Invalid input(s): \"ALPHOS\", \"TBIL\", \"DBIL\", \"TPROT\"    No results for input(s): \"TROP\", \"CK\", \"PBNP\", \"PCT\" in the last 168 hours.    No results for input(s): \"CRP\", \"JACKIE\", \"LDH\", \"DDIMER\" in the last 168 hours.    No results for input(s): \"PGLU\" in the last 168 hours.    Meds:   Scheduled Medication:Scheduled Medications[1]  Continuous Infusing Medication:Medication Infusions[2]  PRN Medication:PRN Medications[3]       Microbiology:    No results found for this visit on 04/07/25.    Lab Results   Component Value Date    COVID19 NOT DETECTED 11/20/2021    COVID19 NOT DETECTED 06/19/2021    COVID19 NOT DETECTED 05/29/2021         Assessment/Plan:     # cervical radiculopathy s/p C4-6 anterior discectomy/fusion  Pt/ot, prn pain meds, bowel regimen, SCDs     # htn/hl  verapamil  lipitor     # asthma  advair       Stable, okay with dc from IM standpoint.        Meredith Trinidad MD  [facility] Hospitalist  Pager: [phone]         [1] [2] [3]    Complex Repair Preamble Text (Leave Blank If You Do Not Want): Extensive wide undermining was performed.

## (undated) DEVICE — SOLUTION IRRIG 1000ML 0.9% NACL USP BTL

## (undated) DEVICE — GLOVE SUR 7.5 SENSICARE PI PIP CRM PWD F

## (undated) DEVICE — SPONGE,NEURO,0.5"X1.5",XR,STRL,LF,10/PK: Brand: MEDLINE

## (undated) DEVICE — SUT STRATAFIX SPRL MCRYL+ 2-0 27IN CT-1 ABSRB

## (undated) DEVICE — 450 ML BOTTLE OF 0.05% CHLORHEXIDINE GLUCONATE IN 99.95% STERILE WATER FOR IRRIGATION, USP AND APPLICATOR.: Brand: IRRISEPT ANTIMICROBIAL WOUND LAVAGE

## (undated) DEVICE — BANDAGE,GAUZE,BULKEE II,4.5"X4.1YD,STRL: Brand: MEDLINE

## (undated) DEVICE — ATTUNE PINNING SYSTEM: Brand: ATTUNE

## (undated) DEVICE — SOLUTION IRRIG 3000ML 0.9% NACL FLX CONT

## (undated) DEVICE — Device

## (undated) DEVICE — BNDG,ELSTC,MATRIX,STRL,6"X5YD,LF,HOOK&LP: Brand: MEDLINE

## (undated) DEVICE — ANTERIOR HIP: Brand: MEDLINE INDUSTRIES, INC.

## (undated) DEVICE — 3M™ MEDIPORE™ H SOFT CLOTH SURGICAL TAPE 2864, 4 INCH X 10 YARD (10CM X 9,14M), 12 ROLLS/CASE: Brand: 3M™ MEDIPORE™

## (undated) DEVICE — Device: Brand: STABLECUT®

## (undated) DEVICE — MONITORING NEUROPHYSIOLOGICAL

## (undated) DEVICE — CONTAINER,SPECIMEN,PNEU TUBE,4OZ,OR STRL: Brand: MEDLINE

## (undated) DEVICE — SLEEVE COMPR MD KNEE LEN SGL USE KENDALL SCD

## (undated) DEVICE — C-ARM: Brand: UNBRANDED

## (undated) DEVICE — PENCIL ES BTTN SWCH W/ TIP HOLSTER E-Z CLN

## (undated) DEVICE — LIGHT HANDLE

## (undated) DEVICE — E-Z CLEAN, NON-STICK, PTFE COATED, ELECTROSURGICAL BLADE ELECTRODE, MODIFIED EXTENDED INSULATION, 4 INCH (10.2 CM): Brand: MEGADYNE

## (undated) DEVICE — STERILE POLYISOPRENE POWDER-FREE SURGICAL GLOVES: Brand: PROTEXIS

## (undated) DEVICE — STANDARD HYPODERMIC NEEDLE,POLYPROPYLENE HUB: Brand: MONOJECT

## (undated) DEVICE — BLADE ELECTRODE: Brand: EDGE

## (undated) DEVICE — SUT VCRL 1 27IN CPX ABSRB UD L48MM 1/2 CIR

## (undated) DEVICE — DISPOSABLE TOURNIQUET CUFF SINGLE BLADDER, DUAL PORT AND QUICK CONNECT CONNECTOR: Brand: COLOR CUFF

## (undated) DEVICE — SYRINGE MED 20ML STD CLR PLAS LL TIP N CTRL

## (undated) DEVICE — LAMINECTOMY CDS: Brand: MEDLINE INDUSTRIES, INC.

## (undated) DEVICE — STOCKINETTE,IMPERVIOUS,12X48,STERILE: Brand: MEDLINE

## (undated) DEVICE — HOOD, PEEL-AWAY: Brand: FLYTE

## (undated) DEVICE — MARKER SKIN PREP-RESISTANT ST: Brand: MEDLINE INDUSTRIES, INC.

## (undated) DEVICE — 3M™ STERI-STRIP™ REINFORCED ADHESIVE SKIN CLOSURES, R1547, 1/2 IN X 4 IN (12 MM X 100 MM), 6 STRIPS/ENVELOPE: Brand: 3M™ STERI-STRIP™

## (undated) DEVICE — WRAP COOLING HIP W/ICE PILLOW

## (undated) DEVICE — HEMOSTAT KIT SURGIFLO THROM 8ML

## (undated) DEVICE — SUTURE VICRYL 1 CPX

## (undated) DEVICE — ATTAHJA VAC WITH 22MM CONNECTR

## (undated) DEVICE — SYRINGE MED 10ML LL TIP W/O SFTY DISP

## (undated) DEVICE — GOWN SUR 2XL LEV 4 BLU W/ WHT V NK BND AERO

## (undated) DEVICE — #15 STERILE STAINLESS BLADE: Brand: STERILE STAINLESS BLADES

## (undated) DEVICE — ANTIBACTERIAL UNDYED BRAIDED (POLYGLACTIN 910), SYNTHETIC ABSORBABLE SUTURE: Brand: COATED VICRYL

## (undated) DEVICE — BANDAGE COHESIVE 4INX5YD TAN E POR SLF ADH

## (undated) DEVICE — WRAP COMPR UNIV KNEE HOT CLD GEL MICWV AND

## (undated) DEVICE — TOTAL KNEE CDS: Brand: MEDLINE INDUSTRIES, INC.

## (undated) DEVICE — UNIVERSAL STERIBUMP® STERILE (5/CASE): Brand: UNIVERSAL STERIBUMP®

## (undated) DEVICE — INSULATED BLADE ELECTRODE: Brand: EDGE

## (undated) DEVICE — SYRINGE 20CC LL TIP

## (undated) DEVICE — 3M™ IOBAN™ 2 ANTIMICROBIAL INCISE DRAPE 6650EZ: Brand: IOBAN™ 2

## (undated) DEVICE — SUT MCRYL 3-0 27IN ABSRB UD 19MM PS-2 3/8

## (undated) DEVICE — PTFE COATED BLADE 4': Brand: MEDLINE

## (undated) DEVICE — MAXCESS C MODULE

## (undated) DEVICE — Device: Brand: BOOT LINER, DISPOSABLE

## (undated) DEVICE — SLIM BODY SKIN STAPLER: Brand: APPOSE ULC

## (undated) DEVICE — APPLICATOR PREP 26ML CHG 2% ISO ALC 70%

## (undated) DEVICE — PAD SACRAL SPAN AID

## (undated) DEVICE — ADHESIVE SKIN TOP FOR WND CLSR DERMBND ADV

## (undated) DEVICE — SUTURE ETHIBOND 5 CCS

## (undated) DEVICE — 4-PORT MANIFOLD: Brand: NEPTUNE 2

## (undated) DEVICE — COVER,LIGHT,CAMERA,HARD,1/PK,STRL: Brand: MEDLINE

## (undated) DEVICE — BOWL BNE CEM MIX DISP QUIK-VAC

## (undated) DEVICE — PAD KNEE POS PROTCT MEM GEL FOAM BOOT AND

## (undated) DEVICE — POSITIONER OR KT PT CR

## (undated) DEVICE — SUTURE MONOCRYL 3-0 PS-2

## (undated) DEVICE — DRAPE,U/SHT,SPLIT,FILM,60X84,STERILE: Brand: MEDLINE

## (undated) DEVICE — HOOD: Brand: FLYTE

## (undated) DEVICE — COVER,TABLE,44X90,STERILE: Brand: MEDLINE

## (undated) DEVICE — 3M™ IOBAN™ 2 ANTIMICROBIAL INCISE DRAPE 6651EZ: Brand: IOBAN™ 2

## (undated) DEVICE — COVER LT HNDL RIG FOR SUR CAM DISP

## (undated) DEVICE — GOWN SURG AERO CHROME XXL

## (undated) DEVICE — PILLOW ABDUCTION HIP SM

## (undated) DEVICE — BIPOLAR SEALER 23-112-1 AQM 6.0: Brand: AQUAMANTYS™

## (undated) DEVICE — 3.0MM PRECISION NEURO (MATCH HEAD)

## (undated) DEVICE — SUTURE VICRYL 2-0 CP-1

## (undated) DEVICE — KENDALL SCD EXPRESS SLEEVES, KNEE LENGTH, MEDIUM: Brand: KENDALL SCD

## (undated) DEVICE — SUT STRATAFIX SYMMTRC PDS+ 1 18IN CTX ABSRB V

## (undated) DEVICE — CODMAN® SURGICAL PATTIES 1/2" X 1/2" (1.27CM X 1.27CM): Brand: CODMAN®

## (undated) NOTE — LETTER
OUTSIDE TESTING RESULT REQUEST     IMPORTANT: FOR YOUR IMMEDIATE ATTENTION  Please FAX all test results listed below to: [phone]     Testing already done on or about: 3/31/2025     * * * * If testing is NOT complete, arrange with patient A.S.A.P. * * * *      Patient Name: Cathryn Snow  Surgery Date: 2025  Medical Record: [id number]  CSN: [id number]  : 1955 - A: 69 y     Sex: female  Surgeon(s):  Richie Callaway MD  Procedure: CERVICAL 4-CERVICAL 5, CERVICAL 5-CERVICAL 6 ANTERIOR CERVICAL DISCECTOMY AND FUSION  Anesthesia Type: General     Surgeon: Richie Callaway MD     The following Testing and Time Line are REQUIRED PER ANESTHESIA     EKG READ AND SIGNED WITHIN   90 days  Chest X-Ray within 6 months  CBC [with Differential & Platelets] within  90 days  CMP (requires 4 hour fast) within  90 days  PT/INR within  30 days  PTT within  30 days  UA within  30 days  MSSA/MRSA Nasal screening within 30 days      Thank You,   Sent by: JUVENTINO Berg

## (undated) NOTE — ED AVS SNAPSHOT
Glenn Magdaleno   MRN: [id number]    Department:  [facility] Emergency Department in [place]   Date of Visit:  2/18/2018           Disclosure     Insurance plans vary and the physician(s) referred by the ER may not be covered by your plan.  Please contact tell this physician (or your personal doctor if your instructions are to return to your personal doctor) about any new or lasting problems. The primary care or specialist physician will see patients referred from the [facility] Emergency Department.  Etelvina Carson

## (undated) NOTE — IP AVS SNAPSHOT
[address]            (For Outpatient Use Only) Initial Admit Date: 11/11/2020   Inpt/Obs Admit Date: Inpt: N/A / Obs: 11/12/20   Discharge Date:    Dilip Gilman:  [de-identified]   MRN: [de-identified]   CSN: [id number]   CEID: [id number] Subscriber Name:  Bessie Albert :    Subscriber ID:  Pt Rel to Subscriber:    Hospital Account Financial Class: Medicare    2020

## (undated) NOTE — IP AVS SNAPSHOT
Patient Demographics     Address  [address] [place] [address] Phone  [phone] (Home)  [phone] (Mobile) *Preferred* E-mail Address  [email or web address]      Emergency Contact(s)     Name Relation Home Work [address] [place] [address] Drive Son [phone] Aspirin 81 mg:  take one tab twice daily to prevent blood clot. Starting evening of surgery. Colace is a stool softener. Please take twice daily. Extra Strength Tylenol. Take 2 tabs (1000mg) 4 times daily.   (maximum 4000mg daily)  Celebrex is a nonste ? Usually allowed after four to six weeks. Discuss specific work activities with your surgeon. Restrictions  ? For hip replacement surgery, follow instructions provided by physical therapy    No smoking  ? Avoid smoking.  It is known to cause breathing ? Review anticoagulant education information sheet provided. Discomfort  ? Surgical discomfort is normal for one to two months. ? Have realistic goals and keep a positive outlook. ?  Keep pain manageable; pain should not disrupt your sleep or activitie ? Good hand washing is important. Everyone should wash their hands or use hand  as soon as they walk in your house-whether they live there or are visiting. ? Keep bed linen/clothing freshly laundered.   ? Do not allow others or pets to touch your ? Increased pain at incision not relieved by pain medication. Signs of Possible Dislocation  ? Increased severe leg or groin pain  ? Turning in or out of surgical leg that is new  ? Increased numbness, tingling to leg  ?  Inability to walk or put DORINDA Loyola ? TEMPORARY HANDICAP PARKING APPLICATION  (good for 3-6 months)  – At Surgeon or PCP visit, request they fill out the form, then go to [facility] (only time you do not wait in a long line there). Some [facility] offices provide the same service.  (Chris Cox Rita Mayo MD. Schedule an appointment as soon as possible for a visit in 2 weeks. Specialty: SURGERY, ORTHOPEDIC  Contact information:  [address] In 2 weeks.     Speci Fioricet -40 MG Caps  Generic drug: Butalbital-APAP-Caffeine      Take 1 capsule by mouth every 4 (four) hours as needed for Pain. FLONASE NA      2 sprays by Nasal route daily.           fluticasone-salmeterol 250-50 MCG/DOSE Aepb  Commonly [id number] Butalbital-APAP-Caffeine (FIORICET) per tab 1 tablet 11/12/20 1102 Given      [id number] Fluticasone Furoate-Vilanterol (BREO ELLIPTA) 100-25 MCG/INH inhaler 1 puff 11/13/20 0928 Given      [id number] Montelukast Sodium (SINGULAIR) tab 10 mg 11/12 CO2 30.0 21.0 - 32.0 mmol/L — [facility])   Anion Gap 4 0 - 18 mmol/L — [facility])   BUN 10 7 - 18 mg/dL — [facility])   Creatinine 0.52 0.55 - 1.02 mg/dL L [facility])   BUN/CREA Ratio 19.2 10.0 - 20.0 — [facility])   Calcium, To CC: Patient presents with:  Eval-P       PCP: Justice Rubinstein JAWICH[JH.1]    Admitted 11/11/20[JH.2]  Assessment and Plan     Carl Vegas is a 77year old female with PMH sig for[JH.1] ADD, anxiety, Bipolar affective d/o most recently manic, migraines who just She is very easily overwhelmed, tearful on exam.  Denies any active SI or plan. Just very worried about all the things that could happen and gets worked up easily. No Cp or SOB. Is doing well with PT. Pain controlled. [JH.2]      Review of Systems  12 po •  oxyCODONE HCl 5 MG Oral Tab, Take 1 tablet (5 mg total) by mouth every 6 (six) hours as needed for Pain (severe pain only). , Disp: 20 tablet, Rfl: 0    •  celecoxib (CELEBREX) 200 MG Oral Cap, Take 1 capsule (200 mg total) by mouth daily. , Disp: 30 caps WBC 17.5* 13.6* 10.7   HGB 11.2* 12.3 11.3*   MCV 90.6 93.6 91.7   .0 306.0 283.0       Recent Labs   Lab 11/10/20  0915 11/11/20  2232 11/12/20  0830    138 136   K 4.2 4.9 4.0    109 103   CO2 25.0 23.0 29.0   BUN 16 20* 12   CREATSERU Related Notes: Addendum by Gagadneep Rodriguez MD (Physician) filed at 11/12/2020  3:55 PM         [facility] Hospitalist H&P     CC: Patient presents with:  Eval-P       PCP: Jesse BARRAGAN[JH.1]    Admitted 11/11/20[JH.2]  Assessment and Plan     [facility] Report of Psychiatric Consultation    Darline Bradshaw Patient Status:  Observation    1955 MRN [id number]   [facility] 3SW-A Attending Steward Kussmaul, MD   Hosp Day # 1 PCP Jenna Avelar     Date of Admission: 20  Date of Consu 78 yo WF with Bipolar disorder unspecified was admitted on 11/11/20 for placement. She is POD#3 Right BARBARA. She was discharged yesterday and was going to stay with her friend Mercedez Lou.  They got into an argument over whether Mercedez Lou could handle taking care o • Arthritis    • Asthma    • Bipolar affective ([facility] [address]    • Cyst of right kidney    • Migraines    • PONV (postoperative nausea and vomiting)    • Visual impairment     glasses     Past Surgical History:   Procedure Laterality Date   •       x 3   • •  Verapamil HCl ER (CALAN-SR) CR tab 120 mg, 120 mg, Oral, Nightly  •  oxyCODONE HCl (OXY-IR) cap/tab 5 mg, 5 mg, Oral, Q4H PRN[RH.1]    Review of Systems   Constitutional: Positive for[RH.2] malaise/fatigue[RH.3].    Psychiatric/Behavioral: Positive for[R Physical Therapy Notes (last 72 hours) (Notes from 11/10/2020 10:58 AM through 11/13/2020 10:58 AM)      Physical Therapy Note signed by Vishnu Dietz PT at 11/12/2020  2:05 PM  Version 1 of 1    Author: Vishnu Dietz PT Service: Rehab [facility] Performed by Jesi Robb MD at [facility] MAIN OR   • KNEE SURGERY      left x 2 and right x 1   • OTHER Bilateral     salpingo oophorectomy   • OTHER Right     wrist ganglion cyst removal   • REMOVAL OF OVARIAN CYST(S)         HOME SITUATION  Type of Home: Hous -   Moving to and from a bed to a chair (including a wheelchair)?: None   -   Need to walk in hospital room?: A Little   -   Climbing 3-5 steps with a railing?: A Little       AM-PAC Score:  Raw Score: 19   Approx Degree of Impairment: 41.77%   Standardize Pt likely needs most of all mental support and addressing mental issues at this time and can be seen with cont PT at [facility] on a daily basis.  JESÚS placement if not admitted to [facility] to max her functional skills and return to home safely with mod I while her ment DISCHARGE RECOMMENDATIONS  PT Discharge Recommendations: Sub-acute rehabilitation    PLAN  PT Treatment Plan: Bed mobility; Body mechanics; Endurance; Energy conservation;Patient education;Gait training;Range of motion;Strengthening;Transfer training;Balance History related to current admission: Pt admitted 11/11/2020 for History of right hip replacement [Z96.641]. Pt is s/p R THR on 11/9. Pt was discharged to home, however instead was brought to [facility]. When not admitted to [facility], pt was re-admitted to [facility] 11/11. Fall Risk: Standard fall risk[SH.2]    WEIGHT BEARING RESTRICTION[SH.1]  Weight Bearing Restriction: None        R Lower Extremity: Weight Bearing as Tolerated[SH.2]       PAIN ASSESSMENT[SH.1]  Rating: Unable to rate  Location: R LE  Management Techniques Educated pt on techniques to safely complete bed mobility; pt able to return demo. Pt completes bed mobility supine >sit independently. Pt completes bed mobility sit > supine with min (A) for RLE.  Pt completes sit <>stand independently with RW with good sa The patient is functioning below her previous functional level and would benefit from skilled inpatient OT to address the above deficits, maximizing patient’s ability to return safely to her prior level of function.     Patient Complexity  Occupational Prof Interventions:  - ***  - See additional Care Plan goals for specific interventions   Patient/Family Short Term Goal     Interdisciplinary Progressing    Description: Patient's Short Term Goal: ***    Interventions:   - ***  - See additional Care Plan goa

## (undated) NOTE — LETTER
OUTSIDE TESTING RESULT REQUEST     IMPORTANT: FOR YOUR IMMEDIATE ATTENTION  Please FAX all test results listed below to: [phone]     Testing already done on or about: 3/31/2025     * * * * If testing is NOT complete, arrange with patient A.S.A.P. * * * *      Patient Name: Cathrny Snow  Surgery Date: 2025  Medical Record: [id number]  CSN: [id number]  : 1955 - A: 69 y     Sex: female  Surgeon(s):  Richie Callaway MD  Procedure: CERVICAL 4-CERVICAL 5, CERVICAL 5-CERVICAL 6 ANTERIOR CERVICAL DISCECTOMY AND FUSION  Anesthesia Type: General     Surgeon: Richei Callaway MD     The following Testing and Time Line are REQUIRED PER ANESTHESIA     EKG READ AND SIGNED WITHIN   90 days  Chest X-Ray within 6 months  CBC [with Differential & Platelets] within  90 days  CMP (requires 4 hour fast) within  90 days  PT/INR within  30 days  PTT within  30 days  UA within  30 days  MSSA/MRSA Nasal screening within 30 days      Thank You,   Sent by: Billie JAUREGUI